# Patient Record
Sex: FEMALE | Race: BLACK OR AFRICAN AMERICAN | Employment: UNEMPLOYED | URBAN - METROPOLITAN AREA
[De-identification: names, ages, dates, MRNs, and addresses within clinical notes are randomized per-mention and may not be internally consistent; named-entity substitution may affect disease eponyms.]

---

## 2014-09-26 LAB — CRC RECOMMENDATION EXT: NORMAL

## 2017-11-27 DIAGNOSIS — I10 BENIGN ESSENTIAL HTN: ICD-10-CM

## 2017-11-27 DIAGNOSIS — Z11.4 ENCOUNTER FOR SCREENING FOR HIV: ICD-10-CM

## 2017-11-27 DIAGNOSIS — Z11.59 NEED FOR HEPATITIS C SCREENING TEST: ICD-10-CM

## 2017-11-27 DIAGNOSIS — Z13.220 SCREENING CHOLESTEROL LEVEL: ICD-10-CM

## 2017-11-27 LAB
ALBUMIN SERPL-MCNC: 4.3 G/DL (ref 3.9–4.9)
ALP BLD-CCNC: 67 U/L (ref 40–130)
ALT SERPL-CCNC: 17 U/L (ref 0–33)
ANION GAP SERPL CALCULATED.3IONS-SCNC: 12 MEQ/L (ref 7–13)
AST SERPL-CCNC: 18 U/L (ref 0–35)
BILIRUB SERPL-MCNC: 0.6 MG/DL (ref 0–1.2)
BUN BLDV-MCNC: 21 MG/DL (ref 8–23)
CALCIUM SERPL-MCNC: 9.8 MG/DL (ref 8.6–10.2)
CHLORIDE BLD-SCNC: 99 MEQ/L (ref 98–107)
CHOLESTEROL, TOTAL: 136 MG/DL (ref 0–199)
CO2: 31 MEQ/L (ref 22–29)
CREAT SERPL-MCNC: 1.01 MG/DL (ref 0.5–0.9)
GFR AFRICAN AMERICAN: >60
GFR NON-AFRICAN AMERICAN: 55.9
GLOBULIN: 3.2 G/DL (ref 2.3–3.5)
GLUCOSE BLD-MCNC: 109 MG/DL (ref 74–109)
HCT VFR BLD CALC: 38.4 % (ref 37–47)
HDLC SERPL-MCNC: 52 MG/DL (ref 40–59)
HEMOGLOBIN: 12.5 G/DL (ref 12–16)
HEPATITIS C ANTIBODY INTERPRETATION: NORMAL
LDL CHOLESTEROL CALCULATED: 43 MG/DL (ref 0–129)
MCH RBC QN AUTO: 28.1 PG (ref 27–31.3)
MCHC RBC AUTO-ENTMCNC: 32.4 % (ref 33–37)
MCV RBC AUTO: 86.8 FL (ref 82–100)
PDW BLD-RTO: 14.4 % (ref 11.5–14.5)
PLATELET # BLD: 226 K/UL (ref 130–400)
POTASSIUM SERPL-SCNC: 4.2 MEQ/L (ref 3.5–5.1)
RBC # BLD: 4.43 M/UL (ref 4.2–5.4)
SODIUM BLD-SCNC: 142 MEQ/L (ref 132–144)
TOTAL PROTEIN: 7.5 G/DL (ref 6.4–8.1)
TRIGL SERPL-MCNC: 206 MG/DL (ref 0–200)
WBC # BLD: 5.5 K/UL (ref 4.8–10.8)

## 2017-11-28 LAB — HIV-1 AND HIV-2 ANTIBODIES: NEGATIVE

## 2018-01-09 ENCOUNTER — HOSPITAL ENCOUNTER (OUTPATIENT)
Dept: WOMENS IMAGING | Age: 63
Discharge: HOME | End: 2018-01-09
Payer: COMMERCIAL

## 2018-01-09 DIAGNOSIS — Z12.31 ENCOUNTER FOR SCREENING MAMMOGRAM FOR BREAST CANCER: ICD-10-CM

## 2018-01-09 PROCEDURE — 77067 SCR MAMMO BI INCL CAD: CPT

## 2018-03-12 ENCOUNTER — TELEPHONE (OUTPATIENT)
Dept: FAMILY MEDICINE CLINIC | Age: 63
End: 2018-03-12

## 2018-03-12 NOTE — TELEPHONE ENCOUNTER
Patient sates she was told to call in if she is not getting better and she was told to call in if she still had the knot on her neck and some antibiotics would be called in. She wanted to know if this can still be done. [facility] in [facility], [place].

## 2018-03-12 NOTE — TELEPHONE ENCOUNTER
Patient stated she wanted an antibiotic because she could not get it let patient know she will not be prescribing medication over the phone especially since she has not been seen since November of last year patient states ok will try to get in

## 2018-04-09 ENCOUNTER — OFFICE VISIT (OUTPATIENT)
Dept: FAMILY MEDICINE CLINIC | Age: 63
End: 2018-04-09
Payer: COMMERCIAL

## 2018-04-09 VITALS
BODY MASS INDEX: 38.44 KG/M2 | DIASTOLIC BLOOD PRESSURE: 82 MMHG | WEIGHT: 239.2 LBS | RESPIRATION RATE: 18 BRPM | SYSTOLIC BLOOD PRESSURE: 116 MMHG | TEMPERATURE: 97 F | HEART RATE: 70 BPM | HEIGHT: 66 IN | OXYGEN SATURATION: 98 %

## 2018-04-09 DIAGNOSIS — R22.42 MASS OF LEFT ANKLE: ICD-10-CM

## 2018-04-09 DIAGNOSIS — I10 BENIGN ESSENTIAL HTN: ICD-10-CM

## 2018-04-09 DIAGNOSIS — Z13.6 SCREENING FOR CARDIOVASCULAR CONDITION: ICD-10-CM

## 2018-04-09 DIAGNOSIS — Z76.0 MEDICATION REFILL: ICD-10-CM

## 2018-04-09 DIAGNOSIS — R22.32 MASS OF LEFT UPPER EXTREMITY: ICD-10-CM

## 2018-04-09 DIAGNOSIS — R63.5 WEIGHT GAIN: ICD-10-CM

## 2018-04-09 DIAGNOSIS — R22.1 NECK MASS: ICD-10-CM

## 2018-04-09 DIAGNOSIS — Z12.11 COLON CANCER SCREENING: ICD-10-CM

## 2018-04-09 LAB
ALBUMIN SERPL-MCNC: 4.2 G/DL (ref 3.9–4.9)
ALP BLD-CCNC: 77 U/L (ref 40–130)
ALT SERPL-CCNC: 18 U/L (ref 0–33)
ANION GAP SERPL CALCULATED.3IONS-SCNC: 15 MEQ/L (ref 7–13)
AST SERPL-CCNC: 18 U/L (ref 0–35)
BASOPHILS ABSOLUTE: 0 K/UL (ref 0–0.2)
BASOPHILS RELATIVE PERCENT: 0.8 %
BILIRUB SERPL-MCNC: 0.4 MG/DL (ref 0–1.2)
BUN BLDV-MCNC: 20 MG/DL (ref 8–23)
CALCIUM SERPL-MCNC: 8.2 MG/DL (ref 8.6–10.2)
CHLORIDE BLD-SCNC: 101 MEQ/L (ref 98–107)
CHOLESTEROL, TOTAL: 131 MG/DL (ref 0–199)
CO2: 28 MEQ/L (ref 22–29)
CREAT SERPL-MCNC: 0.84 MG/DL (ref 0.5–0.9)
EOSINOPHILS ABSOLUTE: 0.2 K/UL (ref 0–0.7)
EOSINOPHILS RELATIVE PERCENT: 3.2 %
GFR AFRICAN AMERICAN: >60
GFR NON-AFRICAN AMERICAN: >60
GLOBULIN: 0.5 G/DL (ref 2.3–3.5)
GLUCOSE BLD-MCNC: 105 MG/DL (ref 74–109)
HCT VFR BLD CALC: 35.7 % (ref 37–47)
HDLC SERPL-MCNC: 44 MG/DL (ref 40–59)
HEMOGLOBIN: 11.8 G/DL (ref 12–16)
LDL CHOLESTEROL CALCULATED: 44 MG/DL (ref 0–129)
LYMPHOCYTES ABSOLUTE: 2.3 K/UL (ref 1–4.8)
LYMPHOCYTES RELATIVE PERCENT: 42.6 %
MCH RBC QN AUTO: 28.5 PG (ref 27–31.3)
MCHC RBC AUTO-ENTMCNC: 32.9 % (ref 33–37)
MCV RBC AUTO: 86.5 FL (ref 82–100)
MONOCYTES ABSOLUTE: 0.5 K/UL (ref 0.2–0.8)
MONOCYTES RELATIVE PERCENT: 8.3 %
NEUTROPHILS ABSOLUTE: 2.5 K/UL (ref 1.4–6.5)
NEUTROPHILS RELATIVE PERCENT: 45.1 %
PDW BLD-RTO: 14.5 % (ref 11.5–14.5)
PLATELET # BLD: 244 K/UL (ref 130–400)
POTASSIUM SERPL-SCNC: 4 MEQ/L (ref 3.5–5.1)
RBC # BLD: 4.13 M/UL (ref 4.2–5.4)
SODIUM BLD-SCNC: 144 MEQ/L (ref 132–144)
T4 FREE: 1.02 NG/DL (ref 0.93–1.7)
TOTAL PROTEIN: 4.7 G/DL (ref 6.4–8.1)
TRIGL SERPL-MCNC: 215 MG/DL (ref 0–200)
TSH SERPL DL<=0.05 MIU/L-ACNC: 0.65 UIU/ML (ref 0.27–4.2)
WBC # BLD: 5.5 K/UL (ref 4.8–10.8)

## 2018-04-09 PROCEDURE — G8427 DOCREV CUR MEDS BY ELIG CLIN: HCPCS | Performed by: PHYSICIAN ASSISTANT

## 2018-04-09 PROCEDURE — 99214 OFFICE O/P EST MOD 30 MIN: CPT | Performed by: PHYSICIAN ASSISTANT

## 2018-04-09 PROCEDURE — 3017F COLORECTAL CA SCREEN DOC REV: CPT | Performed by: PHYSICIAN ASSISTANT

## 2018-04-09 PROCEDURE — 3014F SCREEN MAMMO DOC REV: CPT | Performed by: PHYSICIAN ASSISTANT

## 2018-04-09 PROCEDURE — 1036F TOBACCO NON-USER: CPT | Performed by: PHYSICIAN ASSISTANT

## 2018-04-09 PROCEDURE — G8417 CALC BMI ABV UP PARAM F/U: HCPCS | Performed by: PHYSICIAN ASSISTANT

## 2018-04-09 RX ORDER — METOPROLOL SUCCINATE 100 MG/1
100 TABLET, EXTENDED RELEASE ORAL DAILY
Qty: 90 TABLET | Refills: 3 | Status: SHIPPED | OUTPATIENT
Start: 2018-04-09 | End: 2019-03-19 | Stop reason: ALTCHOICE

## 2018-04-09 RX ORDER — HYDROCHLOROTHIAZIDE 25 MG/1
25 TABLET ORAL DAILY
Qty: 30 TABLET | Refills: 3 | Status: SHIPPED | OUTPATIENT
Start: 2018-04-09 | End: 2018-08-17 | Stop reason: SDUPTHER

## 2018-04-09 RX ORDER — MELOXICAM 7.5 MG/1
TABLET ORAL
COMMUNITY
Start: 2018-02-12 | End: 2018-04-09 | Stop reason: ALTCHOICE

## 2018-04-09 RX ORDER — GABAPENTIN 100 MG/1
CAPSULE ORAL
COMMUNITY
Start: 2018-03-26 | End: 2019-03-19 | Stop reason: ALTCHOICE

## 2018-04-09 ASSESSMENT — ENCOUNTER SYMPTOMS
ABDOMINAL DISTENTION: 0
RECTAL PAIN: 0
CHEST TIGHTNESS: 0
TROUBLE SWALLOWING: 0
WHEEZING: 0
CONSTIPATION: 0
COLOR CHANGE: 0
DIARRHEA: 0
BLOOD IN STOOL: 0
ABDOMINAL PAIN: 0
ANAL BLEEDING: 0
VOMITING: 0
COUGH: 0
ROS SKIN COMMENTS: SKIN LESION
NAUSEA: 0
BACK PAIN: 1
SHORTNESS OF BREATH: 0

## 2018-04-12 ENCOUNTER — TELEPHONE (OUTPATIENT)
Dept: FAMILY MEDICINE CLINIC | Age: 63
End: 2018-04-12

## 2018-04-12 DIAGNOSIS — R22.42 MASS OF LEFT ANKLE: Primary | ICD-10-CM

## 2018-04-12 DIAGNOSIS — R22.32 MASS OF LEFT FOREARM: ICD-10-CM

## 2018-04-19 ENCOUNTER — TELEPHONE (OUTPATIENT)
Dept: FAMILY MEDICINE CLINIC | Age: 63
End: 2018-04-19

## 2018-04-19 RX ORDER — FEXOFENADINE HCL 180 MG/1
180 TABLET ORAL DAILY
Qty: 30 TABLET | Refills: 2 | Status: SHIPPED | OUTPATIENT
Start: 2018-04-19 | End: 2019-03-19 | Stop reason: ALTCHOICE

## 2018-04-19 RX ORDER — FLUTICASONE PROPIONATE 50 MCG
1 SPRAY, SUSPENSION (ML) NASAL DAILY
Qty: 1 BOTTLE | Refills: 3 | Status: SHIPPED | OUTPATIENT
Start: 2018-04-19 | End: 2018-08-17 | Stop reason: SDUPTHER

## 2019-03-19 ENCOUNTER — OFFICE VISIT (OUTPATIENT)
Dept: FAMILY MEDICINE CLINIC | Age: 64
End: 2019-03-19
Payer: COMMERCIAL

## 2019-03-19 VITALS
SYSTOLIC BLOOD PRESSURE: 118 MMHG | RESPIRATION RATE: 14 BRPM | OXYGEN SATURATION: 99 % | HEIGHT: 66 IN | TEMPERATURE: 98.1 F | WEIGHT: 221 LBS | BODY MASS INDEX: 35.52 KG/M2 | HEART RATE: 70 BPM | DIASTOLIC BLOOD PRESSURE: 78 MMHG

## 2019-03-19 DIAGNOSIS — Z13.1 DIABETES MELLITUS SCREENING: ICD-10-CM

## 2019-03-19 DIAGNOSIS — M47.816 LUMBAR SPONDYLOSIS: ICD-10-CM

## 2019-03-19 DIAGNOSIS — Z12.11 COLON CANCER SCREENING: ICD-10-CM

## 2019-03-19 DIAGNOSIS — Z12.39 BREAST CANCER SCREENING: ICD-10-CM

## 2019-03-19 DIAGNOSIS — E78.2 MIXED HYPERLIPIDEMIA: ICD-10-CM

## 2019-03-19 DIAGNOSIS — I10 BENIGN ESSENTIAL HTN: Primary | ICD-10-CM

## 2019-03-19 DIAGNOSIS — F41.1 GAD (GENERALIZED ANXIETY DISORDER): ICD-10-CM

## 2019-03-19 DIAGNOSIS — I10 BENIGN ESSENTIAL HTN: ICD-10-CM

## 2019-03-19 LAB
ALBUMIN SERPL-MCNC: 4.3 G/DL (ref 3.5–4.6)
ALP BLD-CCNC: 100 U/L (ref 40–130)
ALT SERPL-CCNC: <5 U/L (ref 0–33)
ANION GAP SERPL CALCULATED.3IONS-SCNC: 14 MEQ/L (ref 9–15)
AST SERPL-CCNC: 61 U/L (ref 0–35)
BILIRUB SERPL-MCNC: 0.5 MG/DL (ref 0.2–0.7)
BUN BLDV-MCNC: 21 MG/DL (ref 8–23)
CALCIUM SERPL-MCNC: 9.5 MG/DL (ref 8.5–9.9)
CHLORIDE BLD-SCNC: 101 MEQ/L (ref 95–107)
CHOLESTEROL, TOTAL: 130 MG/DL (ref 0–199)
CO2: 27 MEQ/L (ref 20–31)
CREAT SERPL-MCNC: 1.05 MG/DL (ref 0.5–0.9)
GFR AFRICAN AMERICAN: >60
GFR NON-AFRICAN AMERICAN: 53.2
GLOBULIN: 3.5 G/DL (ref 2.3–3.5)
GLUCOSE BLD-MCNC: 106 MG/DL (ref 70–99)
HBA1C MFR BLD: 5.8 % (ref 4.8–5.9)
HCT VFR BLD CALC: 37.7 % (ref 37–47)
HDLC SERPL-MCNC: 39 MG/DL (ref 40–59)
HEMOGLOBIN: 12.4 G/DL (ref 12–16)
LDL CHOLESTEROL CALCULATED: 69 MG/DL (ref 0–129)
MCH RBC QN AUTO: 28.3 PG (ref 27–31.3)
MCHC RBC AUTO-ENTMCNC: 33 % (ref 33–37)
MCV RBC AUTO: 85.9 FL (ref 82–100)
PDW BLD-RTO: 14.1 % (ref 11.5–14.5)
PLATELET # BLD: 251 K/UL (ref 130–400)
POTASSIUM SERPL-SCNC: 4.7 MEQ/L (ref 3.4–4.9)
RBC # BLD: 4.39 M/UL (ref 4.2–5.4)
SODIUM BLD-SCNC: 142 MEQ/L (ref 135–144)
TOTAL PROTEIN: 7.8 G/DL (ref 6.3–8)
TRIGL SERPL-MCNC: 110 MG/DL (ref 0–150)
VITAMIN D 25-HYDROXY: 13 NG/ML (ref 30–100)
WBC # BLD: 5.7 K/UL (ref 4.8–10.8)

## 2019-03-19 PROCEDURE — 3017F COLORECTAL CA SCREEN DOC REV: CPT | Performed by: PHYSICIAN ASSISTANT

## 2019-03-19 PROCEDURE — G8484 FLU IMMUNIZE NO ADMIN: HCPCS | Performed by: PHYSICIAN ASSISTANT

## 2019-03-19 PROCEDURE — G8427 DOCREV CUR MEDS BY ELIG CLIN: HCPCS | Performed by: PHYSICIAN ASSISTANT

## 2019-03-19 PROCEDURE — 99214 OFFICE O/P EST MOD 30 MIN: CPT | Performed by: PHYSICIAN ASSISTANT

## 2019-03-19 PROCEDURE — 1036F TOBACCO NON-USER: CPT | Performed by: PHYSICIAN ASSISTANT

## 2019-03-19 PROCEDURE — G8417 CALC BMI ABV UP PARAM F/U: HCPCS | Performed by: PHYSICIAN ASSISTANT

## 2019-03-19 RX ORDER — METOPROLOL SUCCINATE 100 MG/1
100 TABLET, EXTENDED RELEASE ORAL DAILY
Qty: 90 TABLET | Refills: 4 | Status: SHIPPED | OUTPATIENT
Start: 2019-03-19 | End: 2020-06-02 | Stop reason: SDUPTHER

## 2019-03-19 RX ORDER — LISINOPRIL AND HYDROCHLOROTHIAZIDE 25; 20 MG/1; MG/1
TABLET ORAL
Qty: 90 TABLET | Refills: 4 | Status: SHIPPED | OUTPATIENT
Start: 2019-03-19 | End: 2020-01-30 | Stop reason: SDUPTHER

## 2019-03-19 RX ORDER — TRAZODONE HYDROCHLORIDE 50 MG/1
TABLET ORAL
Refills: 0 | COMMUNITY
Start: 2019-01-15 | End: 2019-03-19 | Stop reason: ALTCHOICE

## 2019-03-19 RX ORDER — CARIPRAZINE 1.5 MG/1
CAPSULE, GELATIN COATED ORAL
Refills: 0 | COMMUNITY
Start: 2019-01-15 | End: 2020-06-09 | Stop reason: SINTOL

## 2019-03-19 RX ORDER — QUETIAPINE FUMARATE 300 MG/1
300 TABLET, FILM COATED ORAL
COMMUNITY
Start: 2017-09-29 | End: 2019-03-19 | Stop reason: ALTCHOICE

## 2019-03-19 ASSESSMENT — ENCOUNTER SYMPTOMS
CONSTIPATION: 0
ABDOMINAL DISTENTION: 0
ABDOMINAL PAIN: 0
WHEEZING: 0
SHORTNESS OF BREATH: 0
ANAL BLEEDING: 0
RECTAL PAIN: 0
DIARRHEA: 0
BACK PAIN: 1
COLOR CHANGE: 0
CHEST TIGHTNESS: 0
NAUSEA: 0
BLOOD IN STOOL: 0
TROUBLE SWALLOWING: 0
VOMITING: 0
COUGH: 0

## 2019-03-21 ENCOUNTER — TELEPHONE (OUTPATIENT)
Dept: FAMILY MEDICINE CLINIC | Age: 64
End: 2019-03-21

## 2019-03-21 RX ORDER — ERGOCALCIFEROL 1.25 MG/1
50000 CAPSULE ORAL WEEKLY
Qty: 12 CAPSULE | Refills: 1 | Status: SHIPPED | OUTPATIENT
Start: 2019-03-21 | End: 2019-09-22 | Stop reason: SDUPTHER

## 2019-10-01 ENCOUNTER — TELEPHONE (OUTPATIENT)
Dept: FAMILY MEDICINE CLINIC | Age: 64
End: 2019-10-01

## 2019-10-01 DIAGNOSIS — Z12.39 SCREENING FOR BREAST CANCER: Primary | ICD-10-CM

## 2019-10-08 ENCOUNTER — TELEPHONE (OUTPATIENT)
Dept: FAMILY MEDICINE CLINIC | Age: 64
End: 2019-10-08

## 2019-10-08 DIAGNOSIS — I10 BENIGN ESSENTIAL HTN: ICD-10-CM

## 2019-10-08 DIAGNOSIS — E55.9 VITAMIN D DEFICIENCY: Primary | ICD-10-CM

## 2019-10-10 ENCOUNTER — HOSPITAL ENCOUNTER (OUTPATIENT)
Dept: WOMENS IMAGING | Age: 64
Discharge: HOME | End: 2019-10-12
Payer: COMMERCIAL

## 2019-10-10 DIAGNOSIS — Z12.39 BREAST CANCER SCREENING: ICD-10-CM

## 2019-10-10 DIAGNOSIS — E55.9 VITAMIN D DEFICIENCY: ICD-10-CM

## 2019-10-10 DIAGNOSIS — Z12.39 SCREENING FOR BREAST CANCER: ICD-10-CM

## 2019-10-10 DIAGNOSIS — I10 BENIGN ESSENTIAL HTN: ICD-10-CM

## 2019-10-10 LAB
ANION GAP SERPL CALCULATED.3IONS-SCNC: 17 MEQ/L (ref 9–15)
BUN BLDV-MCNC: 23 MG/DL (ref 8–23)
CALCIUM SERPL-MCNC: 9.9 MG/DL (ref 8.5–9.9)
CHLORIDE BLD-SCNC: 101 MEQ/L (ref 95–107)
CO2: 27 MEQ/L (ref 20–31)
CREAT SERPL-MCNC: 1.04 MG/DL (ref 0.5–0.9)
GFR AFRICAN AMERICAN: >60
GFR NON-AFRICAN AMERICAN: 53.7
GLUCOSE BLD-MCNC: 106 MG/DL (ref 70–99)
POTASSIUM SERPL-SCNC: 3.9 MEQ/L (ref 3.4–4.9)
SODIUM BLD-SCNC: 145 MEQ/L (ref 135–144)
VITAMIN D 25-HYDROXY: 31.8 NG/ML (ref 30–100)

## 2019-10-10 PROCEDURE — 77067 SCR MAMMO BI INCL CAD: CPT

## 2020-05-26 RX ORDER — ERGOCALCIFEROL 1.25 MG/1
CAPSULE ORAL
Qty: 12 CAPSULE | Refills: 1 | OUTPATIENT
Start: 2020-05-26

## 2020-05-26 RX ORDER — METOPROLOL SUCCINATE 100 MG/1
100 TABLET, EXTENDED RELEASE ORAL DAILY
Qty: 90 TABLET | Refills: 2 | OUTPATIENT
Start: 2020-05-26

## 2020-06-02 RX ORDER — LISINOPRIL AND HYDROCHLOROTHIAZIDE 25; 20 MG/1; MG/1
TABLET ORAL
Qty: 90 TABLET | Refills: 2 | Status: SHIPPED | OUTPATIENT
Start: 2020-06-02 | End: 2021-03-11 | Stop reason: SINTOL

## 2020-06-02 RX ORDER — METOPROLOL SUCCINATE 100 MG/1
100 TABLET, EXTENDED RELEASE ORAL DAILY
Qty: 90 TABLET | Refills: 2 | Status: SHIPPED | OUTPATIENT
Start: 2020-06-02 | End: 2021-01-29

## 2020-06-09 ENCOUNTER — VIRTUAL VISIT (OUTPATIENT)
Dept: FAMILY MEDICINE CLINIC | Age: 65
End: 2020-06-09
Payer: COMMERCIAL

## 2020-06-09 PROCEDURE — G8428 CUR MEDS NOT DOCUMENT: HCPCS | Performed by: PHYSICIAN ASSISTANT

## 2020-06-09 PROCEDURE — 99214 OFFICE O/P EST MOD 30 MIN: CPT | Performed by: PHYSICIAN ASSISTANT

## 2020-06-09 PROCEDURE — 3017F COLORECTAL CA SCREEN DOC REV: CPT | Performed by: PHYSICIAN ASSISTANT

## 2020-06-09 RX ORDER — ERGOCALCIFEROL 1.25 MG/1
CAPSULE ORAL
Qty: 12 CAPSULE | Refills: 4 | Status: SHIPPED | OUTPATIENT
Start: 2020-06-09 | End: 2021-05-26

## 2020-06-09 RX ORDER — FLUTICASONE PROPIONATE 50 MCG
SPRAY, SUSPENSION (ML) NASAL
Qty: 1 BOTTLE | Refills: 3 | Status: SHIPPED | OUTPATIENT
Start: 2020-06-09 | End: 2020-06-09

## 2020-06-09 ASSESSMENT — PATIENT HEALTH QUESTIONNAIRE - PHQ9
SUM OF ALL RESPONSES TO PHQ9 QUESTIONS 1 & 2: 2
SUM OF ALL RESPONSES TO PHQ QUESTIONS 1-9: 2
SUM OF ALL RESPONSES TO PHQ QUESTIONS 1-9: 2
1. LITTLE INTEREST OR PLEASURE IN DOING THINGS: 1
2. FEELING DOWN, DEPRESSED OR HOPELESS: 1

## 2020-06-09 NOTE — PROGRESS NOTES
2020    TELEHEALTH EVALUATION -- Audio/Visual (During VXIDO-79 public health emergency)    Due to COVID 19 outbreak, patient's office visit was converted to a virtual visit. Patient was contacted and agreed to proceed with a virtual visit via Telephone Visit--total length of time 20 minutes. The risks and benefits of converting to a virtual visit were discussed in light of the current infectious disease epidemic. Patient also understood that insurance coverage and co-pays are up to their individual insurance plans. HPI:    Aaron Simmonds (:  1955) has requested an audio/video evaluation for the following concern(s):    Last OV with me: 3/19/2019  UTD with mammogram (10/2019)  Due for colon ca screen. Has cologuard box at home. Has not completed it yet. Patient has a history of HTN, BERT, chronic back pain.       Chronic back pain:  She is followed by [facility] pain [facility] (last OV was 2019) for chronic pain. Next OV is on 6/10/2020. She continues to take Tramadol 50mg PO BID as needed for pain, Lyrica 25mg once daily. She reports her current regimen offers her 50-60% pain relief. Some days are \"better than others\". Weather related--pattern. Denies new injury/fall/trauma. [facility] report ran and is consistent with current and past history concerning scheduled medications. Constipation. +abdominal bloating, discomfort with hard stool. Most likely related to chronic opioid use. Will have a bowel movement every couple of days. Denies melena, hematochezia. BERT:  Patient sees a counselor and psychiatrist at [facility]. Symptoms are stable with ativan. She takes medication PRN. Never started vraylar due to reading about the side effects.      Essential HTN:  Well-controlled on current medications. Denies CP, SOB, QUINTERO, edema of lower extremities. Seasonal allergies. Increased nasal congestion/PND this time of year.   Previous Rx for Flonase did provide good relief. Asking if she can have this refilled today. No fever, cough, SOB. Review of Systems   Constitutional: Negative for activity change, appetite change, chills, diaphoresis, fatigue, fever and unexpected weight change. HENT: Negative for congestion, mouth sores, nosebleeds and trouble swallowing. Eyes: Negative for visual disturbance. Respiratory: Negative for cough, chest tightness, shortness of breath and wheezing. Cardiovascular: Negative for chest pain, palpitations and leg swelling. Gastrointestinal: Positive for abdominal distention (bloating) and constipation. Negative for abdominal pain, anal bleeding, blood in stool, diarrhea, nausea, rectal pain and vomiting. Genitourinary: Negative for dysuria. Musculoskeletal: Positive for arthralgias, back pain, gait problem (intermittent) and myalgias. Chronic   Skin: Negative for color change, rash and wound. Neurological: Negative for dizziness, weakness, light-headedness and headaches. Hematological: Negative for adenopathy. Does not bruise/bleed easily. Psychiatric/Behavioral: Negative for dysphoric mood and sleep disturbance. The patient is nervous/anxious (controlled). Prior to Visit Medications    Medication Sig Taking? Authorizing Provider   vitamin D (ERGOCALCIFEROL) 1.25 MG (93204 UT) CAPS capsule TAKE 1 CAPSULE BY MOUTH 1 TIME A WEEK Yes Norma Espinosa PA-C   naloxegol (MOVANTIK) 12.5 MG TABS tablet Take 1 tablet by mouth every morning Yes Norma Espinosa PA-C   lisinopril-hydroCHLOROthiazide (PRINZIDE;ZESTORETIC) 20-25 MG per tablet TAKE 1 TABLET BY MOUTH DAILY Yes Norma Espinosa PA-C   metoprolol succinate (TOPROL XL) 100 MG extended release tablet Take 1 tablet by mouth daily Yes Norma Espinosa PA-C   fluticasone (FLONASE) 50 MCG/ACT nasal spray SHAKE LIQUID AND USE 1 SPRAY IN EACH NOSTRIL DAILY  Norma Espinosa PA-C   LORazepam (ATIVAN) 2 MG tablet Take 2 mg by mouth every 6 hours as needed for Anxiety .   Historical

## 2020-06-10 PROBLEM — K59.03 THERAPEUTIC OPIOID INDUCED CONSTIPATION: Status: ACTIVE | Noted: 2020-06-10

## 2020-06-10 PROBLEM — J30.2 SEASONAL ALLERGIES: Status: ACTIVE | Noted: 2020-06-10

## 2020-06-10 PROBLEM — T40.2X5A THERAPEUTIC OPIOID INDUCED CONSTIPATION: Status: ACTIVE | Noted: 2020-06-10

## 2020-06-10 PROBLEM — E55.9 VITAMIN D DEFICIENCY: Status: ACTIVE | Noted: 2020-06-10

## 2020-06-10 PROBLEM — I10 BENIGN ESSENTIAL HTN: Status: ACTIVE | Noted: 2020-06-10

## 2020-06-10 ASSESSMENT — ENCOUNTER SYMPTOMS
CONSTIPATION: 1
WHEEZING: 0
BLOOD IN STOOL: 0
COUGH: 0
ABDOMINAL DISTENTION: 1
ABDOMINAL PAIN: 0
COLOR CHANGE: 0
TROUBLE SWALLOWING: 0
ANAL BLEEDING: 0
VOMITING: 0
DIARRHEA: 0
BACK PAIN: 1
RECTAL PAIN: 0
SHORTNESS OF BREATH: 0
NAUSEA: 0
CHEST TIGHTNESS: 0

## 2020-06-25 ENCOUNTER — TELEPHONE (OUTPATIENT)
Dept: FAMILY MEDICINE CLINIC | Age: 65
End: 2020-06-25

## 2020-06-25 NOTE — TELEPHONE ENCOUNTER
Patient called today and stated that she is feeling like that she is having a sinus infection and is wondering if there is a way that a medication can be called into the pharmacy.

## 2020-07-24 ENCOUNTER — TELEPHONE (OUTPATIENT)
Dept: FAMILY MEDICINE CLINIC | Age: 65
End: 2020-07-24

## 2020-07-24 NOTE — TELEPHONE ENCOUNTER
Patient states the nasal spray is not working and wants an antibiotic for her sinuses. I offered her to come to the walk-in clinic and she refused stating she has an appointment on 8/3/20 and states she will wait for that.

## 2020-08-03 ENCOUNTER — VIRTUAL VISIT (OUTPATIENT)
Dept: FAMILY MEDICINE CLINIC | Age: 65
End: 2020-08-03
Payer: COMMERCIAL

## 2020-08-03 PROCEDURE — G0438 PPPS, INITIAL VISIT: HCPCS | Performed by: PHYSICIAN ASSISTANT

## 2020-08-03 PROCEDURE — 3017F COLORECTAL CA SCREEN DOC REV: CPT | Performed by: PHYSICIAN ASSISTANT

## 2020-08-03 RX ORDER — FEXOFENADINE HCL 180 MG/1
180 TABLET ORAL DAILY
Qty: 90 TABLET | Refills: 1 | Status: SHIPPED | OUTPATIENT
Start: 2020-08-03 | End: 2021-08-17 | Stop reason: SDUPTHER

## 2020-08-03 RX ORDER — AMOXICILLIN 875 MG/1
875 TABLET, COATED ORAL 2 TIMES DAILY
Qty: 20 TABLET | Refills: 0 | Status: SHIPPED | OUTPATIENT
Start: 2020-08-03 | End: 2020-08-13

## 2020-08-03 NOTE — PROGRESS NOTES
yellow/green with increased cheek pressure. Thinks she may have a sinus infection. Review of Systems   Constitutional: Negative for appetite change, chills, diaphoresis, fatigue, fever and unexpected weight change. HENT: Positive for congestion, postnasal drip, sinus pressure, sinus pain and sneezing. Respiratory: Negative for cough, chest tightness and shortness of breath. Cardiovascular: Negative for chest pain, palpitations and leg swelling. Endocrine: Negative for polydipsia, polyphagia and polyuria. Genitourinary: Positive for frequency. Negative for decreased urine volume, enuresis, urgency, vaginal bleeding and vaginal discharge. Musculoskeletal: Positive for arthralgias, back pain and myalgias. Allergic/Immunologic: Positive for environmental allergies. Prior to Visit Medications    Medication Sig Taking? Authorizing Provider   fexofenadine (ALLEGRA) 180 MG tablet Take 1 tablet by mouth daily Yes Norma Espinosa PA-C   amoxicillin (AMOXIL) 875 MG tablet Take 1 tablet by mouth 2 times daily for 10 days Yes Norma Espinosa PA-C   fluticasone (FLONASE) 50 MCG/ACT nasal spray SHAKE LIQUID AND USE 1 SPRAY IN EACH NOSTRIL DAILY  Norma Espinosa PA-C   vitamin D (ERGOCALCIFEROL) 1.25 MG (67510 UT) CAPS capsule TAKE 1 CAPSULE BY MOUTH 1 TIME A WEEK  Norma Espinosa PA-C   naloxegol (MOVANTIK) 12.5 MG TABS tablet Take 1 tablet by mouth every morning  Norma Espinosa PA-C   lisinopril-hydroCHLOROthiazide (PRINZIDE;ZESTORETIC) 20-25 MG per tablet TAKE 1 TABLET BY MOUTH DAILY  Norma Espinosa PA-C   metoprolol succinate (TOPROL XL) 100 MG extended release tablet Take 1 tablet by mouth daily  Norma Espinosa PA-C   LORazepam (ATIVAN) 2 MG tablet Take 2 mg by mouth every 6 hours as needed for Anxiety .   Historical Provider, MD   traMADol (ULTRAM) 50 MG tablet 1-2 tablet by mouth 3 times a day  for pain prn  Ingrid Aldana MD       Social History     Tobacco Use    Smoking status: Never Smoker    Smokeless tobacco: Never Used   Substance Use Topics    Alcohol use: No     Alcohol/week: 0.0 standard drinks    Drug use: No      Allergies   Allergen Reactions    Ibuprofen Other (See Comments)    Midazolam Hcl      Other reaction(s):  Other: See Comments  Makes her feel more anxious    Nsaids Other (See Comments)    Sulfa Antibiotics Other (See Comments)     elevated blood pressure    Sulfasalazine Other (See Comments)     elevated blood pressure   ,   Past Medical History:   Diagnosis Date    Anxiety     Chronic back pain     Hypertension     Obesity     Pain     ankle and leg   ,   Past Surgical History:   Procedure Laterality Date    CYST REMOVAL      FRACTURE SURGERY      HYSTERECTOMY     ,   Social History     Tobacco Use    Smoking status: Never Smoker    Smokeless tobacco: Never Used   Substance Use Topics    Alcohol use: No     Alcohol/week: 0.0 standard drinks    Drug use: No   ,   Family History   Problem Relation Age of Onset    Cancer Mother         esophageal    Seizures Father     Seizures Brother         back problems    Cancer Maternal Aunt         esophageal    Cancer Maternal Uncle         lung   ,   There is no immunization history on file for this patient.,   Health Maintenance   Topic Date Due    DTaP/Tdap/Td vaccine (1 - Tdap) 08/14/1976    Shingles Vaccine (1 of 2) 08/14/2007    Cervical cancer screen  04/27/2013    A1C test (Diabetic or Prediabetic)  03/19/2020    Annual Wellness Visit (AWV)  06/02/2020    Flu vaccine (1) 09/01/2020    Potassium monitoring  10/10/2020    Creatinine monitoring  10/10/2020    Breast cancer screen  10/10/2021    Lipid screen  03/19/2024    Colon cancer screen colonoscopy  05/09/2026    Hepatitis C screen  Completed    HIV screen  Completed    Hepatitis A vaccine  Aged Out    Hepatitis B vaccine  Aged Out    Hib vaccine  Aged Out    Meningococcal (ACWY) vaccine  Aged Out    Pneumococcal 0-64 years Vaccine  Aged Out     PHYSICAL EXAMINATION:  [ INSTRUCTIONS:  \"[x]\" Indicates a positive item  \"[]\" Indicates a negative item  -- DELETE ALL ITEMS NOT EXAMINED]  [x] Alert  [x] Oriented to person/place/time    [x] No apparent distress  [] Toxic appearing    [] Face flushed appearing [] Sclera clear  [] Lips are cyanotic      [x] Breathing appears normal  [] Appears tachypneic      [] Rash on visible skin    [x] Cranial Nerves II-XII grossly intact    [x] Motor grossly intact in visible upper extremities    [] Motor grossly intact in visible lower extremities    [x] Normal Mood  [] Anxious appearing    [] Depressed appearing  [] Confused appearing      [] Poor short term memory  [] Poor long term memory    [] OTHER:      Due to this being a TeleHealth encounter, evaluation of the following organ systems is limited: Vitals/Constitutional/EENT/Resp/CV/GI//MS/Neuro/Skin/Heme-Lymph-Imm. ASSESSMENT/PLAN:  1. Routine general medical examination at a health care facility  AWV completed today. 2. Acute non-recurrent maxillary sinusitis  Continue flonase.     - amoxicillin (AMOXIL) 875 MG tablet; Take 1 tablet by mouth 2 times daily for 10 days  Dispense: 20 tablet; Refill: 0    3. Seasonal allergies  Add antihistamine    - fexofenadine (ALLEGRA) 180 MG tablet; Take 1 tablet by mouth daily  Dispense: 90 tablet; Refill: 1    4. Benign essential HTN  Controlled. Discussed  the lisinopril and hctz to see if stopping the HCTZ improves the nocturnal urination/frequency. 5. Lumbar spondylosis  [facility] pain management. 6. Neuropathic pain syndrome (non-herpetic)  Taking lyrica. Return in 7 weeks (on 9/21/2020) for Medicare Annual Wellness Visit in 1 year, follow up in office. An  electronic signature was used to authenticate this note.     --Yanique Abel PA-C on 8/5/2020 at 2:07 PM        Pursuant to the emergency declaration under the [address] [facility] waiver authority and the Coronavirus Preparedness and Response Supplemental Appropriations Act, this Virtual  Visit was conducted, with patient's consent, to reduce the patient's risk of exposure to COVID-19 and provide continuity of care for an established patient. Services were provided through a video synchronous discussion virtually to substitute for in-person clinic visit. Medicare Annual Wellness Visit  Name: Lilliam Espinoza Date: 2020   MRN: [id number] Sex: Female   Age: 60 y.o. Ethnicity: Non-/Non    : 1955 Race: Black      Vilma Rock is here for Medicare AWV and Hypertension (follow up )    Screenings for behavioral, psychosocial and functional/safety risks, and cognitive dysfunction are all negative except as indicated below. These results, as well as other patient data from the [address] form, are documented in Flowsheets linked to this Encounter. Allergies   Allergen Reactions    Ibuprofen Other (See Comments)    Midazolam Hcl      Other reaction(s): Other: See Comments  Makes her feel more anxious    Nsaids Other (See Comments)    Sulfa Antibiotics Other (See Comments)     elevated blood pressure    Sulfasalazine Other (See Comments)     elevated blood pressure         Prior to Visit Medications    Medication Sig Taking?  Authorizing Provider   fexofenadine (ALLEGRA) 180 MG tablet Take 1 tablet by mouth daily Yes Norma Espinosa PA-C   amoxicillin (AMOXIL) 875 MG tablet Take 1 tablet by mouth 2 times daily for 10 days Yes Norma Espinosa PA-C   fluticasone (FLONASE) 50 MCG/ACT nasal spray SHAKE LIQUID AND USE 1 SPRAY IN EACH NOSTRIL DAILY  Norma Espinosa PA-C   vitamin D (ERGOCALCIFEROL) 1.25 MG (93568 UT) CAPS capsule TAKE 1 CAPSULE BY MOUTH 1 TIME A WEEK  Norma Espinosa PA-C   naloxegol (MOVANTIK) 12.5 MG TABS tablet Take 1 tablet by mouth every morning  Norma Espinosa PA-C   lisinopril-hydroCHLOROthiazide (PRINZIDE;ZESTORETIC) 20-25 MG per tablet TAKE 1 TABLET BY MOUTH DAILY or weight on file to calculate BMI. Health Habits/Nutrition Interventions:  · Inadequate physical activity:  patient is not ready to increase his/her physical activity level at this time    Personalized Preventive Plan   Current Health Maintenance Status    There is no immunization history on file for this patient. Health Maintenance   Topic Date Due    DTaP/Tdap/Td vaccine (1 - Tdap) 08/14/1976    Shingles Vaccine (1 of 2) 08/14/2007    Cervical cancer screen  04/27/2013    A1C test (Diabetic or Prediabetic)  03/19/2020    Annual Wellness Visit (AWV)  06/02/2020    Flu vaccine (1) 09/01/2020    Potassium monitoring  10/10/2020    Creatinine monitoring  10/10/2020    Breast cancer screen  10/10/2021    Lipid screen  03/19/2024    Colon cancer screen colonoscopy  05/09/2026    Hepatitis C screen  Completed    HIV screen  Completed    Hepatitis A vaccine  Aged Out    Hepatitis B vaccine  Aged Out    Hib vaccine  Aged Out    Meningococcal (ACWY) vaccine  Aged Out    Pneumococcal 0-64 years Vaccine  Aged Out     Recommendations for [employer] Due: see orders and patient instructions/AVS.  . Recommended screening schedule for the next 5-10 years is provided to the patient in written form: see Patient Instructions/AVS.    Mg Land was seen today for medicare awv and hypertension. Diagnoses and all orders for this visit:    Routine general medical examination at a health care facility    Acute non-recurrent maxillary sinusitis  -     amoxicillin (AMOXIL) 875 MG tablet; Take 1 tablet by mouth 2 times daily for 10 days    Seasonal allergies  -     fexofenadine (ALLEGRA) 180 MG tablet; Take 1 tablet by mouth daily    Benign essential HTN    Lumbar spondylosis    Neuropathic pain syndrome (non-herpetic)            Los Leahy is a 60 y.o. female being evaluated by a Virtual Visit (video and audio) encounter to address concerns as mentioned above. A caregiver was present when appropriate. Due to this being a TeleHealth encounter (During ZSRDN-60 public health emergency), evaluation of the following organ systems was limited: Vitals/Constitutional/EENT/Resp/CV/GI//MS/Neuro/Skin/Heme-Lymph-Imm. Pursuant to the emergency declaration under the [address] [place] [address], [address] [place] [address] and the [employer] Act, this Virtual Visit was conducted with patient's (and/or legal guardian's) consent, to reduce the patient's risk of exposure to COVID-19 and provide necessary medical care. The patient (and/or legal guardian) has also been advised to contact this office for worsening conditions or problems, and seek emergency medical treatment and/or call 911 if deemed necessary. Patient identification was verified at the start of the visit: Yes    Services were provided through a video synchronous discussion virtually to substitute for in-person clinic visit. Patient and provider were located at their individual homes. --Erendira Leon PA-C on 8/5/2020 at 2:07 PM    An electronic signature was used to authenticate this note.

## 2020-08-05 PROBLEM — J01.00 ACUTE NON-RECURRENT MAXILLARY SINUSITIS: Status: ACTIVE | Noted: 2020-08-05

## 2020-08-05 ASSESSMENT — PATIENT HEALTH QUESTIONNAIRE - PHQ9
SUM OF ALL RESPONSES TO PHQ QUESTIONS 1-9: 0
SUM OF ALL RESPONSES TO PHQ QUESTIONS 1-9: 0

## 2020-08-05 ASSESSMENT — ENCOUNTER SYMPTOMS
SHORTNESS OF BREATH: 0
CHEST TIGHTNESS: 0
COUGH: 0
BACK PAIN: 1
SINUS PRESSURE: 1
SINUS PAIN: 1

## 2020-08-05 NOTE — PATIENT INSTRUCTIONS
Personalized Preventive Plan for Neisha Mcfadden - 8/3/2020  Medicare offers a range of preventive health benefits. Some of the tests and screenings are paid in full while other may be subject to a deductible, co-insurance, and/or copay. Some of these benefits include a comprehensive review of your medical history including lifestyle, illnesses that may run in your family, and various assessments and screenings as appropriate. After reviewing your medical record and screening and assessments performed today your provider may have ordered immunizations, labs, imaging, and/or referrals for you. A list of these orders (if applicable) as well as your Preventive Care list are included within your After Visit Summary for your review. Other Preventive Recommendations:    · A preventive eye exam performed by an eye specialist is recommended every 1-2 years to screen for glaucoma; cataracts, macular degeneration, and other eye disorders. · A preventive dental visit is recommended every 6 months. · Try to get at least 150 minutes of exercise per week or 10,000 steps per day on a pedometer . · Order or download the FREE \"Exercise & Physical Activity: Your Everyday Guide\" from [employer]. Call 4-749.468.1042 or search [employer] online. · You need 9684-3335 mg of calcium and 7618-7629 IU of vitamin D per day. It is possible to meet your calcium requirement with diet alone, but a vitamin D supplement is usually necessary to meet this goal.  · When exposed to the sun, use a sunscreen that protects against both UVA and UVB radiation with an SPF of 30 or greater. Reapply every 2 to 3 hours or after sweating, drying off with a towel, or swimming. · Always wear a seat belt when traveling in a car. Always wear a helmet when riding a bicycle or motorcycle.

## 2021-02-16 ENCOUNTER — VIRTUAL VISIT (OUTPATIENT)
Dept: FAMILY MEDICINE CLINIC | Age: 66
End: 2021-02-16
Payer: MEDICARE

## 2021-02-16 DIAGNOSIS — M47.816 ARTHROPATHY OF LUMBAR FACET JOINT: ICD-10-CM

## 2021-02-16 DIAGNOSIS — E55.9 VITAMIN D DEFICIENCY: ICD-10-CM

## 2021-02-16 DIAGNOSIS — F43.21 GRIEF REACTION: ICD-10-CM

## 2021-02-16 DIAGNOSIS — F41.9 ANXIETY: ICD-10-CM

## 2021-02-16 DIAGNOSIS — K59.03 THERAPEUTIC OPIOID INDUCED CONSTIPATION: ICD-10-CM

## 2021-02-16 DIAGNOSIS — Z13.220 SCREENING CHOLESTEROL LEVEL: ICD-10-CM

## 2021-02-16 DIAGNOSIS — R82.90 MALODOROUS URINE: ICD-10-CM

## 2021-02-16 DIAGNOSIS — Z12.31 ENCOUNTER FOR SCREENING MAMMOGRAM FOR MALIGNANT NEOPLASM OF BREAST: ICD-10-CM

## 2021-02-16 DIAGNOSIS — T40.2X5A THERAPEUTIC OPIOID INDUCED CONSTIPATION: ICD-10-CM

## 2021-02-16 DIAGNOSIS — I10 BENIGN ESSENTIAL HTN: Primary | ICD-10-CM

## 2021-02-16 PROBLEM — F43.20 GRIEF REACTION: Status: ACTIVE | Noted: 2021-02-16

## 2021-02-16 PROBLEM — J01.00 ACUTE NON-RECURRENT MAXILLARY SINUSITIS: Status: RESOLVED | Noted: 2020-08-05 | Resolved: 2021-02-16

## 2021-02-16 PROCEDURE — 81002 URINALYSIS NONAUTO W/O SCOPE: CPT | Performed by: PHYSICIAN ASSISTANT

## 2021-02-16 PROCEDURE — G8428 CUR MEDS NOT DOCUMENT: HCPCS | Performed by: PHYSICIAN ASSISTANT

## 2021-02-16 PROCEDURE — 3017F COLORECTAL CA SCREEN DOC REV: CPT | Performed by: PHYSICIAN ASSISTANT

## 2021-02-16 PROCEDURE — 99214 OFFICE O/P EST MOD 30 MIN: CPT | Performed by: PHYSICIAN ASSISTANT

## 2021-02-16 RX ORDER — DOCUSATE SODIUM 100 MG/1
100 CAPSULE, LIQUID FILLED ORAL 2 TIMES DAILY
Qty: 60 CAPSULE | Refills: 5 | Status: SHIPPED | OUTPATIENT
Start: 2021-02-16 | End: 2021-03-18

## 2021-02-16 ASSESSMENT — ENCOUNTER SYMPTOMS
ABDOMINAL PAIN: 0
SINUS PRESSURE: 0
SINUS PAIN: 0
BLOOD IN STOOL: 0
BACK PAIN: 1
SHORTNESS OF BREATH: 0
COLOR CHANGE: 0
NAUSEA: 0
ABDOMINAL DISTENTION: 0
DIARRHEA: 0
RECTAL PAIN: 0
CONSTIPATION: 1
CHEST TIGHTNESS: 0
COUGH: 0

## 2021-02-16 ASSESSMENT — PATIENT HEALTH QUESTIONNAIRE - PHQ9
1. LITTLE INTEREST OR PLEASURE IN DOING THINGS: 1
SUM OF ALL RESPONSES TO PHQ9 QUESTIONS 1 & 2: 2
SUM OF ALL RESPONSES TO PHQ QUESTIONS 1-9: 2

## 2021-02-16 NOTE — PROGRESS NOTES
2021    TELEHEALTH EVALUATION -- Audio/Visual (During SRENE-25 public health emergency)    Due to [place] 19 outbreak, patient's office visit was converted to a virtual visit. Patient was contacted and agreed to proceed with a virtual visit via Telephone Visit--total length of call 15-20 minutes. The risks and benefits of converting to a virtual visit were discussed in light of the current infectious disease epidemic. Patient also understood that insurance coverage and co-pays are up to their individual insurance plans. HPI:    Maya Edward (:  1955) has requested an audio/video evaluation for the following concern(s):    VV today for follow up. Last OV with me: 2020. Due for mammogram and routine labs. Acute grief reaction/depression. Going to therapy for multiple deaths in her family--8 in the past 6 months. Feels better with therapy and prn medication. Denies worsening mood/depression. Also taking ativan 2 mg PRN for acute stress/panic. Malodor to urine. X 1 month. C/o strong malodor of urine for the past month. Denies dysuria. Staying hydrated. HTN. Stable on current medications. Denies CP, dizziness, SOB, QUINTERO. Vit D therapy  Remains on replacement therapy. Seasonal allergies. Controlled with allegra and flonase. Chronic pain. Followed by pain management. Pain is controlled with tramadol. Constipation. Intermittent. +hard stool. Most likely related to chronic pain medication--tramadol. Movantik sent for patient at last OV--not approved by insurance. Review of Systems   Constitutional: Negative for appetite change, chills, diaphoresis, fatigue, fever and unexpected weight change. HENT: Negative for congestion, postnasal drip, sinus pressure, sinus pain and sneezing. Respiratory: Negative for cough, chest tightness and shortness of breath. Cardiovascular: Negative for chest pain, palpitations and leg swelling. Gastrointestinal: Positive for constipation. Negative for abdominal distention, abdominal pain, blood in stool, diarrhea, nausea and rectal pain. Endocrine: Negative for polydipsia, polyphagia and polyuria. Genitourinary: Positive for frequency. Negative for decreased urine volume, enuresis, urgency, vaginal bleeding and vaginal discharge. Musculoskeletal: Positive for arthralgias, back pain and myalgias. Chronic   Skin: Negative for color change and rash. Allergic/Immunologic: Positive for environmental allergies. Neurological: Negative for dizziness, light-headedness and headaches. Psychiatric/Behavioral: Negative for dysphoric mood and sleep disturbance. The patient is not nervous/anxious. Prior to Visit Medications    Medication Sig Taking? Authorizing Provider   docusate sodium (COLACE) 100 MG capsule Take 1 capsule by mouth 2 times daily Yes Norma Espinosa PA-C   metoprolol succinate (TOPROL XL) 100 MG extended release tablet TAKE 1 TABLET BY MOUTH DAILY Yes Norma Espinosa PA-C   fexofenadine (ALLEGRA) 180 MG tablet Take 1 tablet by mouth daily Yes Norma Espinosa PA-C   fluticasone (FLONASE) 50 MCG/ACT nasal spray SHAKE LIQUID AND USE 1 SPRAY IN EACH NOSTRIL DAILY Yes Norma Espinosa PA-C   vitamin D (ERGOCALCIFEROL) 1.25 MG (59795 UT) CAPS capsule TAKE 1 CAPSULE BY MOUTH 1 TIME A WEEK Yes Norma Espinosa PA-C   lisinopril-hydroCHLOROthiazide (PRINZIDE;ZESTORETIC) 20-25 MG per tablet TAKE 1 TABLET BY MOUTH DAILY Yes Norma Espinosa PA-C   LORazepam (ATIVAN) 2 MG tablet Take 2 mg by mouth every 6 hours as needed for Anxiety .  Yes Historical Provider, MD   traMADol (ULTRAM) 50 MG tablet 1-2 tablet by mouth 3 times a day  for pain prn Yes Samuel Manriquez MD       Social History     Tobacco Use    Smoking status: Never Smoker    Smokeless tobacco: Never Used   Substance Use Topics    Alcohol use: No     Alcohol/week: 0.0 standard drinks    Drug use: No      Allergies   Allergen Reactions  Ibuprofen Other (See Comments)    Midazolam Hcl      Other reaction(s):  Other: See Comments  Makes her feel more anxious    Nsaids Other (See Comments)    Sulfa Antibiotics Other (See Comments)     elevated blood pressure    Sulfasalazine Other (See Comments)     elevated blood pressure   ,   Past Medical History:   Diagnosis Date    Anxiety     Chronic back pain     Hypertension     Obesity     Pain     ankle and leg   ,   Past Surgical History:   Procedure Laterality Date    CYST REMOVAL      FRACTURE SURGERY      HYSTERECTOMY     ,   Social History     Tobacco Use    Smoking status: Never Smoker    Smokeless tobacco: Never Used   Substance Use Topics    Alcohol use: No     Alcohol/week: 0.0 standard drinks    Drug use: No   ,   Family History   Problem Relation Age of Onset    Cancer Mother         esophageal    Seizures Father     Seizures Brother         back problems    Cancer Maternal Aunt         esophageal    Cancer Maternal Uncle         lung   ,   There is no immunization history on file for this patient.,   Health Maintenance   Topic Date Due    DTaP/Tdap/Td vaccine (1 - Tdap) 08/14/1976    Shingles Vaccine (1 of 2) 08/14/2007    Cervical cancer screen  04/27/2013    A1C test (Diabetic or Prediabetic)  03/19/2020    Flu vaccine (1) 09/01/2020    Potassium monitoring  10/10/2020    Creatinine monitoring  10/10/2020    Annual Wellness Visit (AWV)  08/04/2021    Breast cancer screen  10/10/2021    Lipid screen  03/19/2024    Colon cancer screen colonoscopy  05/09/2026    Hepatitis C screen  Completed    HIV screen  Completed    Hepatitis A vaccine  Aged Out    Hepatitis B vaccine  Aged Out    Hib vaccine  Aged Out    Meningococcal (ACWY) vaccine  Aged Out    Pneumococcal 0-64 years Vaccine  Aged Out       PHYSICAL EXAMINATION:  [ INSTRUCTIONS:  \"[x]\" Indicates a positive item  \"[]\" Indicates a negative item  -- DELETE ALL ITEMS NOT EXAMINED]  [x] Alert  [x] Oriented to person/place/time    [x] No apparent distress  [] Toxic appearing    [] Face flushed appearing [] Sclera clear  [] Lips are cyanotic      [x] Breathing appears normal  [] Appears tachypneic      [] Rash on visible skin    [] Cranial Nerves II-XII grossly intact    [] Motor grossly intact in visible upper extremities    [] Motor grossly intact in visible lower extremities    [x] Normal Mood  [] Anxious appearing    [] Depressed appearing  [] Confused appearing      [] Poor short term memory  [] Poor long term memory    [] OTHER:      Due to this being a TeleHealth encounter, evaluation of the following organ systems is limited: Vitals/Constitutional/EENT/Resp/CV/GI//MS/Neuro/Skin/Heme-Lymph-Imm. ASSESSMENT/PLAN:  1. Benign essential HTN  Stable on current medications. - CBC Auto Differential; Future  - Comprehensive Metabolic Panel; Future    2. Malodorous urine  Urine sample in the office when picking up lab orders.     - POCT Urinalysis no Micro    3. Vitamin D deficiency  On replacement therapy. Due to have level checked. - Vitamin D 25 Hydroxy; Future    4. Therapeutic opioid induced constipation  Movantik not approved. - docusate sodium (COLACE) 100 MG capsule; Take 1 capsule by mouth 2 times daily  Dispense: 60 capsule; Refill: 5    5. Screening cholesterol level    - Lipid Panel; Future    6. Encounter for screening mammogram for malignant neoplasm of breast    - EL DIGITAL SCREEN W OR WO CAD BILATERAL; Future    7. Arthropathy of lumbar facet joint  Followed by pain management. Tramadol for pain. 8. Anxiety  Ativan PRN. Medication filled by Nat Maria PA-C.     9. Grief reaction  Seeing therapy. Return in about 6 months (around 8/16/2021) for follow up in office/AWV . An  electronic signature was used to authenticate this note.     --Sonali Prado PA-C on 2/16/2021 at 1:32 PM        Pursuant to the emergency declaration under the [address]

## 2021-02-22 ENCOUNTER — HOSPITAL ENCOUNTER (OUTPATIENT)
Dept: WOMENS IMAGING | Age: 66
Discharge: HOME | End: 2021-02-24
Payer: MEDICARE

## 2021-02-22 DIAGNOSIS — I10 BENIGN ESSENTIAL HTN: ICD-10-CM

## 2021-02-22 DIAGNOSIS — Z13.220 SCREENING CHOLESTEROL LEVEL: ICD-10-CM

## 2021-02-22 DIAGNOSIS — Z12.31 ENCOUNTER FOR SCREENING MAMMOGRAM FOR MALIGNANT NEOPLASM OF BREAST: ICD-10-CM

## 2021-02-22 DIAGNOSIS — E55.9 VITAMIN D DEFICIENCY: ICD-10-CM

## 2021-02-22 LAB
ALBUMIN SERPL-MCNC: 4.2 G/DL (ref 3.5–4.6)
ALP BLD-CCNC: 79 U/L (ref 40–130)
ALT SERPL-CCNC: 14 U/L (ref 0–33)
ANION GAP SERPL CALCULATED.3IONS-SCNC: 11 MEQ/L (ref 9–15)
AST SERPL-CCNC: 14 U/L (ref 0–35)
BASOPHILS ABSOLUTE: 0 K/UL (ref 0–0.2)
BASOPHILS RELATIVE PERCENT: 0.3 %
BILIRUB SERPL-MCNC: 0.6 MG/DL (ref 0.2–0.7)
BUN BLDV-MCNC: 26 MG/DL (ref 8–23)
CALCIUM SERPL-MCNC: 9.9 MG/DL (ref 8.5–9.9)
CHLORIDE BLD-SCNC: 104 MEQ/L (ref 95–107)
CHOLESTEROL, TOTAL: 131 MG/DL (ref 0–199)
CO2: 27 MEQ/L (ref 20–31)
CREAT SERPL-MCNC: 1.33 MG/DL (ref 0.5–0.9)
EOSINOPHILS ABSOLUTE: 0.1 K/UL (ref 0–0.7)
EOSINOPHILS RELATIVE PERCENT: 2 %
GFR AFRICAN AMERICAN: 48.7
GFR NON-AFRICAN AMERICAN: 40.2
GLOBULIN: 3.5 G/DL (ref 2.3–3.5)
GLUCOSE BLD-MCNC: 103 MG/DL (ref 70–99)
HCT VFR BLD CALC: 37.3 % (ref 37–47)
HDLC SERPL-MCNC: 56 MG/DL (ref 40–59)
HEMOGLOBIN: 12.1 G/DL (ref 12–16)
LDL CHOLESTEROL CALCULATED: 55 MG/DL (ref 0–129)
LYMPHOCYTES ABSOLUTE: 1.9 K/UL (ref 1–4.8)
LYMPHOCYTES RELATIVE PERCENT: 28.2 %
MCH RBC QN AUTO: 28.2 PG (ref 27–31.3)
MCHC RBC AUTO-ENTMCNC: 32.5 % (ref 33–37)
MCV RBC AUTO: 86.5 FL (ref 82–100)
MONOCYTES ABSOLUTE: 0.5 K/UL (ref 0.2–0.8)
MONOCYTES RELATIVE PERCENT: 6.8 %
NEUTROPHILS ABSOLUTE: 4.2 K/UL (ref 1.4–6.5)
NEUTROPHILS RELATIVE PERCENT: 62.7 %
PDW BLD-RTO: 13.6 % (ref 11.5–14.5)
PLATELET # BLD: 265 K/UL (ref 130–400)
POTASSIUM SERPL-SCNC: 4 MEQ/L (ref 3.4–4.9)
RBC # BLD: 4.31 M/UL (ref 4.2–5.4)
SODIUM BLD-SCNC: 142 MEQ/L (ref 135–144)
TOTAL PROTEIN: 7.7 G/DL (ref 6.3–8)
TRIGL SERPL-MCNC: 101 MG/DL (ref 0–150)
VITAMIN D 25-HYDROXY: 35.2 NG/ML (ref 30–100)
WBC # BLD: 6.7 K/UL (ref 4.8–10.8)

## 2021-02-22 PROCEDURE — 77067 SCR MAMMO BI INCL CAD: CPT

## 2021-02-23 ENCOUNTER — TELEPHONE (OUTPATIENT)
Dept: FAMILY MEDICINE CLINIC | Age: 66
End: 2021-02-23

## 2021-02-23 DIAGNOSIS — I10 BENIGN ESSENTIAL HTN: Primary | ICD-10-CM

## 2021-02-23 DIAGNOSIS — N18.2 STAGE 2 CHRONIC KIDNEY DISEASE: ICD-10-CM

## 2021-02-23 NOTE — TELEPHONE ENCOUNTER
Yes, continue medications. I referred her to Dr. Maddy Tabares just for assessment. I am not concerned at all, I just want to make sure things are controlled and continue that way.

## 2021-02-23 NOTE — TELEPHONE ENCOUNTER
Patient calling back in regards to her recent labs. She had some concerns in regards to her lab work. I reassured her there is nothing to worry about and labs look good. She states she does not follow a kidney specialist and would also like to know if she should continue to take her water pill because she is urinating often.      Please advise

## 2021-02-26 ENCOUNTER — TELEPHONE (OUTPATIENT)
Dept: FAMILY MEDICINE CLINIC | Age: 66
End: 2021-02-26

## 2021-02-26 NOTE — TELEPHONE ENCOUNTER
Patient is calling to get antibiotics, she went to a different urgent care and she was told that she needed to call her pcp to get it. Was walking on tight boots and the top of her left foot is swollen now. I explained to her that she needed to be seen for antibiotics at our [facility] but she refused stating that she is in [facility] [employer] at the moment. Please advise and thanks!

## 2021-03-03 ENCOUNTER — HOSPITAL ENCOUNTER (EMERGENCY)
Age: 66
Discharge: HOME | End: 2021-03-03
Payer: MEDICARE

## 2021-03-03 ENCOUNTER — APPOINTMENT (OUTPATIENT)
Dept: GENERAL RADIOLOGY | Age: 66
End: 2021-03-03
Payer: MEDICARE

## 2021-03-03 VITALS
HEIGHT: 71 IN | WEIGHT: 220 LBS | SYSTOLIC BLOOD PRESSURE: 128 MMHG | TEMPERATURE: 97.9 F | RESPIRATION RATE: 18 BRPM | HEART RATE: 72 BPM | DIASTOLIC BLOOD PRESSURE: 79 MMHG | BODY MASS INDEX: 30.8 KG/M2 | OXYGEN SATURATION: 98 %

## 2021-03-03 DIAGNOSIS — M10.072 ACUTE IDIOPATHIC GOUT OF LEFT FOOT: Primary | ICD-10-CM

## 2021-03-03 LAB — URIC ACID, SERUM: 9 MG/DL (ref 2.4–5.7)

## 2021-03-03 PROCEDURE — 84550 ASSAY OF BLOOD/URIC ACID: CPT

## 2021-03-03 PROCEDURE — 99283 EMERGENCY DEPT VISIT LOW MDM: CPT

## 2021-03-03 PROCEDURE — 36415 COLL VENOUS BLD VENIPUNCTURE: CPT

## 2021-03-03 PROCEDURE — 6370000000 HC RX 637 (ALT 250 FOR IP): Performed by: PHYSICIAN ASSISTANT

## 2021-03-03 PROCEDURE — 73630 X-RAY EXAM OF FOOT: CPT

## 2021-03-03 RX ORDER — COLCHICINE 0.6 MG/1
1.2 TABLET ORAL ONCE
Status: COMPLETED | OUTPATIENT
Start: 2021-03-03 | End: 2021-03-03

## 2021-03-03 RX ORDER — ACETAMINOPHEN 325 MG/1
650 TABLET ORAL ONCE
Status: COMPLETED | OUTPATIENT
Start: 2021-03-03 | End: 2021-03-03

## 2021-03-03 RX ORDER — METHYLPREDNISOLONE 4 MG/1
TABLET ORAL
Qty: 1 KIT | Refills: 0 | Status: SHIPPED | OUTPATIENT
Start: 2021-03-03 | End: 2021-03-09

## 2021-03-03 RX ORDER — COLCHICINE 0.6 MG/1
0.6 TABLET ORAL 2 TIMES DAILY
Qty: 14 TABLET | Refills: 0 | Status: SHIPPED | OUTPATIENT
Start: 2021-03-03 | End: 2021-03-11 | Stop reason: ALTCHOICE

## 2021-03-03 RX ADMIN — ACETAMINOPHEN 650 MG: 325 TABLET ORAL at 13:40

## 2021-03-03 RX ADMIN — COLCHICINE 1.2 MG: 0.6 TABLET, FILM COATED ORAL at 13:41

## 2021-03-03 ASSESSMENT — ENCOUNTER SYMPTOMS
GASTROINTESTINAL NEGATIVE: 1
EYES NEGATIVE: 1
RESPIRATORY NEGATIVE: 1

## 2021-03-03 ASSESSMENT — PAIN DESCRIPTION - LOCATION
LOCATION: FOOT
LOCATION: FOOT;TOE (COMMENT WHICH ONE)

## 2021-03-03 ASSESSMENT — PAIN DESCRIPTION - DESCRIPTORS: DESCRIPTORS: ACHING

## 2021-03-03 ASSESSMENT — PAIN DESCRIPTION - PAIN TYPE: TYPE: ACUTE PAIN

## 2021-03-03 ASSESSMENT — PAIN SCALES - GENERAL
PAINLEVEL_OUTOF10: 5
PAINLEVEL_OUTOF10: 4

## 2021-03-03 ASSESSMENT — PAIN DESCRIPTION - FREQUENCY: FREQUENCY: CONTINUOUS

## 2021-03-03 NOTE — ED NOTES
Straight draw performed. Pt's blood specimen collected. Tubes labeled and sent to lab via tube system.        Caleb De Los Santos RN  03/03/21 [id number]

## 2021-03-03 NOTE — ED TRIAGE NOTES
Patient c/o left foot/toe pain, swelling, redness, started last Monday. Patient denies any injury. Patient states her daughter googled it and told her she needed to be seen because it's gout.

## 2021-03-03 NOTE — ED PROVIDER NOTES
[facility] ED  eMERGENCY dEPARTMENT eNCOUnter      Pt Name: Vanesa Liao  MRN: [id number]  [place] [birth year]  Date of evaluation: 3/3/2021  Provider: Daphne Summesr PA-C      HISTORY OF PRESENT ILLNESS    Vanesa Liao is a 63 y.o. female who presents to the Emergency Department with chief complaint of left foot and ankle pain. Patient states that started about 8 days ago and was the most painful in her left great toe. Patient states it was red and swollen. Patient states that significantly better today, but is not resolved completely. Patient does not have a history of gout that she is aware of. She has not taken anything for pain prior to arrival.  Patient denies any specific injury or trauma other than noting wearing boots while walking around at [employer] 24 hours prior to symptom onset. Patient has no other concerns at this time. REVIEW OF SYSTEMS       Review of Systems   Constitutional: Negative. HENT: Negative. Eyes: Negative. Respiratory: Negative. Cardiovascular: Negative. Gastrointestinal: Negative. Endocrine: Negative. Genitourinary: Negative. Musculoskeletal: Positive for arthralgias. Skin: Negative. Neurological: Negative. Psychiatric/Behavioral: Negative.           PAST MEDICAL HISTORY     Past Medical History:   Diagnosis Date    Anxiety     Chronic back pain     Hypertension     Obesity     Pain     ankle and leg         SURGICAL HISTORY       Past Surgical History:   Procedure Laterality Date    BREAST SURGERY      CYST REMOVAL      FRACTURE SURGERY      HYSTERECTOMY           CURRENT MEDICATIONS       Previous Medications    DOCUSATE SODIUM (COLACE) 100 MG CAPSULE    Take 1 capsule by mouth 2 times daily    FEXOFENADINE (ALLEGRA) 180 MG TABLET    Take 1 tablet by mouth daily    FLUTICASONE (FLONASE) 50 MCG/ACT NASAL SPRAY    SHAKE LIQUID AND USE 1 SPRAY IN EACH NOSTRIL DAILY    LISINOPRIL-HYDROCHLOROTHIAZIDE (PRINZIDE;ZESTORETIC) 20-25 MG PER TABLET    TAKE 1 TABLET BY MOUTH DAILY    LORAZEPAM (ATIVAN) 2 MG TABLET    Take 2 mg by mouth every 6 hours as needed for Anxiety .     METOPROLOL SUCCINATE (TOPROL XL) 100 MG EXTENDED RELEASE TABLET    TAKE 1 TABLET BY MOUTH DAILY    TRAMADOL (ULTRAM) 50 MG TABLET    1-2 tablet by mouth 3 times a day  for pain prn    VITAMIN D (ERGOCALCIFEROL) 1.25 MG (21878 UT) CAPS CAPSULE    TAKE 1 CAPSULE BY MOUTH 1 TIME A WEEK       ALLERGIES     Ibuprofen, Midazolam hcl, Nsaids, Sulfa antibiotics, and Sulfasalazine    FAMILY HISTORY       Family History   Problem Relation Age of Onset    Cancer Mother         esophageal    Seizures Father     Seizures Brother         back problems    Cancer Maternal Aunt         esophageal    Cancer Maternal Uncle         lung          SOCIAL HISTORY       Social History     Socioeconomic History    Marital status:      Spouse name: None    Number of children: None    Years of education: None    Highest education level: None   Occupational History    None   Social Needs    Financial resource strain: None    Food insecurity     Worry: None     Inability: None    Transportation needs     Medical: None     Non-medical: None   Tobacco Use    Smoking status: Never Smoker    Smokeless tobacco: Never Used   Substance and Sexual Activity    Alcohol use: No     Alcohol/week: 0.0 standard drinks    Drug use: No    Sexual activity: Yes     Comment: divorce   Lifestyle    Physical activity     Days per week: None     Minutes per session: None    Stress: None   Relationships    Social connections     Talks on phone: None     Gets together: None     Attends Oriental orthodox service: None     Active member of club or organization: None     Attends meetings of clubs or organizations: None     Relationship status: None    Intimate partner violence     Fear of current or ex partner: None     Emotionally abused: None     Physically abused: None Forced sexual activity: None   Other Topics Concern    None   Social History Narrative    None       SCREENINGS      @FLOW([id number])@      PHYSICAL EXAM    (up to 7 for level 4, 8 or more for level 5)     ED Triage Vitals [03/03/21 1204]   BP Temp Temp src Pulse Resp SpO2 Height Weight   126/83 96.8 °F (36 °C) -- 66 18 98 % 5' 11\" (1.803 m) 220 lb (99.8 kg)       Physical Exam  Constitutional:       General: She is not in acute distress. Appearance: She is well-developed. HENT:      Head: Normocephalic and atraumatic. Eyes:      Conjunctiva/sclera: Conjunctivae normal.      Pupils: Pupils are equal, round, and reactive to light. Neck:      Musculoskeletal: Normal range of motion and neck supple. Cardiovascular:      Rate and Rhythm: Normal rate and regular rhythm. Heart sounds: No murmur. Pulmonary:      Effort: No respiratory distress. Breath sounds: Normal breath sounds. No wheezing or rales. Abdominal:      General: There is no distension. Palpations: Abdomen is soft. Tenderness: There is no abdominal tenderness. Musculoskeletal: Normal range of motion. Left ankle: She exhibits swelling. Tenderness. Lateral malleolus tenderness found. Left foot: Tenderness and bony tenderness present. Feet:    Skin:     General: Skin is warm and dry. Findings: No erythema or rash. Neurological:      Mental Status: She is alert and oriented to person, place, and time. Cranial Nerves: No cranial nerve deficit. Psychiatric:         Judgment: Judgment normal.           All other labs were within normal range or not returned as of this dictation. EMERGENCY DEPARTMENT COURSE and DIFFERENTIALDIAGNOSIS/MDM:   Vitals:    Vitals:    03/03/21 1204   BP: 126/83   Pulse: 66   Resp: 18   Temp: 96.8 °F (36 °C)   SpO2: 98%   Weight: 220 lb (99.8 kg)   Height: 5' 11\" (1.803 m)        X-ray left foot is negative for acute abnormality.   Uric acid level is significantly elevated at 9.0. Patient started on colchicine and also given Tylenol while in the emergency room. Patient has allergy to NSAIDs. Patient will be kept on a short course of colchicine as well as Medrol Dosepak for initial therapy and is advised to follow-up with family doctor in the next few days. Patient verbalizes understanding of plan discharge is no further questions. Return here if symptoms worsen or if new concerning symptoms arise. PROCEDURES:  Unless otherwise noted below, none     Procedures      FINAL IMPRESSION      1.  Acute idiopathic gout of left foot          DISPOSITION/PLAN   DISPOSITION            Yuki Delacruz PA-C (electronically signed)  Attending Emergency Physician  [facility] Marty Brown PA-C  03/03/21 2330

## 2021-03-11 ENCOUNTER — VIRTUAL VISIT (OUTPATIENT)
Dept: FAMILY MEDICINE CLINIC | Age: 66
End: 2021-03-11
Payer: MEDICARE

## 2021-03-11 DIAGNOSIS — E79.0 HYPERURICEMIA: ICD-10-CM

## 2021-03-11 DIAGNOSIS — N18.31 STAGE 3A CHRONIC KIDNEY DISEASE (HCC): ICD-10-CM

## 2021-03-11 DIAGNOSIS — M10.9 ACUTE GOUT OF FOOT, UNSPECIFIED CAUSE, UNSPECIFIED LATERALITY: Primary | ICD-10-CM

## 2021-03-11 DIAGNOSIS — I10 BENIGN ESSENTIAL HTN: ICD-10-CM

## 2021-03-11 PROBLEM — R82.90 MALODOROUS URINE: Status: RESOLVED | Noted: 2021-02-16 | Resolved: 2021-03-11

## 2021-03-11 PROCEDURE — 99214 OFFICE O/P EST MOD 30 MIN: CPT | Performed by: PHYSICIAN ASSISTANT

## 2021-03-11 RX ORDER — LISINOPRIL 20 MG/1
20 TABLET ORAL DAILY
Qty: 90 TABLET | Refills: 1
Start: 2021-03-11 | End: 2021-05-13 | Stop reason: SDUPTHER

## 2021-03-11 RX ORDER — ALLOPURINOL 100 MG/1
100 TABLET ORAL DAILY
Qty: 90 TABLET | Refills: 1 | Status: SHIPPED | OUTPATIENT
Start: 2021-03-11 | End: 2021-08-15

## 2021-03-11 ASSESSMENT — ENCOUNTER SYMPTOMS
COLOR CHANGE: 0
NAUSEA: 0
BACK PAIN: 1
SHORTNESS OF BREATH: 0
SINUS PAIN: 0
BLOOD IN STOOL: 0
RECTAL PAIN: 0
SINUS PRESSURE: 0
DIARRHEA: 0
ABDOMINAL PAIN: 0
CHEST TIGHTNESS: 0
ABDOMINAL DISTENTION: 0
COUGH: 0

## 2021-03-11 NOTE — PATIENT INSTRUCTIONS
Gout: Care Instructions  Overview     Gout is a form of arthritis caused by a buildup of uric acid crystals in a joint. It causes sudden attacks of pain, swelling, redness, and stiffness, usually in one joint, especially the big toe. Gout usually comes on without a cause. But it can be brought on by drinking alcohol (especially beer), eating or drinking things made with high-fructose corn syrup, or eating seafood or red meat. Taking certain medicines, such as diuretics, can also trigger an attack of gout. Taking your medicines as prescribed and following up with your doctor regularly can help you avoid gout attacks in the future. Follow-up care is a key part of your treatment and safety. Be sure to make and go to all appointments, and call your doctor if you are having problems. It's also a good idea to know your test results and keep a list of the medicines you take. How can you care for yourself at home? · If the joint is swollen, put ice or a cold pack on the area for 10 to 20 minutes at a time. Put a thin cloth between the ice and your skin. · Prop up the sore limb on a pillow when you ice it or anytime you sit or lie down during the next 3 days. Try to keep it above the level of your heart. This can help reduce swelling. · Rest sore joints. Avoid activities that put weight or strain on the joints for a few days. Take short rest breaks from your regular activities during the day. · Take your medicines exactly as prescribed. Call your doctor if you think you are having a problem with your medicine. · Take pain medicines exactly as directed. ? If the doctor gave you a prescription medicine for pain, take it as prescribed. ? If you are not taking a prescription pain medicine, ask your doctor if you can take an over-the-counter medicine. · Eat less seafood and red meat. · Avoid foods or drinks that are made with high-fructose corn syrup. · Check with your doctor before drinking alcohol.   · Losing weight, if you are overweight, may help reduce attacks of gout. But do not go on a diet that causes rapid weight loss. Losing a lot of weight in a short amount of time can cause a gout attack. When should you call for help? Call your doctor now or seek immediate medical care if:    · You have a fever.     · The joint is so painful you cannot use it.     · You have sudden, unexplained swelling, redness, warmth, or severe pain in one or more joints. Watch closely for changes in your health, and be sure to contact your doctor if:    · You have joint pain.     · Your symptoms get worse or are not improving after 2 or 3 days. Where can you learn more? Go to https://[employer]. org and sign in to your [employer] account. Enter X639 in the [place] box to learn more about \"Gout: Care Instructions. \"     If you do not have an account, please click on the \"Sign Up Now\" link. Current as of: August 5, 2020               Content Version: 12.8  © 2006-2021 [employer]wise, Inc[employer]. Care instructions adapted under license by [facility] [place] ([facility]). If you have questions about a medical condition or this instruction, always ask your healthcare professional. [place] [address] any warranty or liability for your use of this information.

## 2021-03-11 NOTE — PROGRESS NOTES
3/11/2021    TELEHEALTH EVALUATION -- Audio/Visual (During XNCXI-15 public health emergency)    Due to [place] 19 outbreak, patient's office visit was converted to a virtual visit. Patient was contacted and agreed to proceed with a virtual visit via Telephone Visit--total length of call 20 minutes. The risks and benefits of converting to a virtual visit were discussed in light of the current infectious disease epidemic. Patient also understood that insurance coverage and co-pays are up to their individual insurance plans. HPI:    Arnulfo Coughlin (:  1955) has requested an audio/video evaluation for the following concern(s):    VV today for recent ED visit. Last OV with me: 2021 VV for follow up. ED follow up  Presented to [facility] ED on 3/3/2021 for acute pain of L foot. Woke up Monday morning with sudden erythema, pain and swelling. No trauma/injury/fall. Xray of L foot--no acute osseous abnormality. Mild soft tissue swelling of forefoot, noted. She was discharged home in stable condition with Rx for colchicine. Foot pain has improved. Reviewed medication list--history of taking HCTZ; however, patient states that she is no longer taking dual lisinopril/hctz, just 20 mg lisinopril. Admits to poor water intake--has been trying to increase water amount throughout the day. Results for orders placed or performed during the hospital encounter of 21   URIC ACID   Result Value Ref Range    Uric Acid, Serum 9.0 (H) 2.4 - 5.7 mg/dL     Renal insufficiency. Did not follow up/establish with nephrology. Would like to discuss labs from 2021. Avoiding nephrotoxins; denies excessive NSAID use. Admits to poor water intake. +hyperuricemia. Blood pressure remains controlled. Review of Systems   Constitutional: Negative for appetite change, chills, diaphoresis, fatigue, fever and unexpected weight change.    HENT: Negative for congestion, postnasal drip, sinus pressure, sinus pain and sneezing. Respiratory: Negative for cough, chest tightness and shortness of breath. Cardiovascular: Negative for chest pain, palpitations and leg swelling. Gastrointestinal: Negative for abdominal distention, abdominal pain, blood in stool, diarrhea, nausea and rectal pain. Endocrine: Negative for polydipsia, polyphagia and polyuria. Genitourinary: Positive for frequency (nocturnal, per patient). Negative for decreased urine volume, enuresis, urgency, vaginal bleeding and vaginal discharge. Musculoskeletal: Positive for arthralgias, back pain and myalgias. Chronic   Skin: Negative for color change and rash. Allergic/Immunologic: Positive for environmental allergies. Neurological: Negative for dizziness, light-headedness and headaches. Psychiatric/Behavioral: Negative for dysphoric mood and sleep disturbance. The patient is not nervous/anxious. Prior to Visit Medications    Medication Sig Taking? Authorizing Provider   lisinopril (PRINIVIL;ZESTRIL) 20 MG tablet Take 1 tablet by mouth daily Yes Norma Espinosa PA-C   allopurinol (ZYLOPRIM) 100 MG tablet Take 1 tablet by mouth daily Yes Norma Espinosa PA-C   docusate sodium (COLACE) 100 MG capsule Take 1 capsule by mouth 2 times daily Yes Norma Espinosa PA-C   metoprolol succinate (TOPROL XL) 100 MG extended release tablet TAKE 1 TABLET BY MOUTH DAILY Yes Norma Espinosa PA-C   fexofenadine (ALLEGRA) 180 MG tablet Take 1 tablet by mouth daily Yes Norma Espinosa PA-C   fluticasone (FLONASE) 50 MCG/ACT nasal spray SHAKE LIQUID AND USE 1 SPRAY IN EACH NOSTRIL DAILY Yes Norma Espinosa PA-C   vitamin D (ERGOCALCIFEROL) 1.25 MG (58577 UT) CAPS capsule TAKE 1 CAPSULE BY MOUTH 1 TIME A WEEK Yes Norma Espinosa PA-C   LORazepam (ATIVAN) 2 MG tablet Take 2 mg by mouth every 6 hours as needed for Anxiety .  Yes Historical Provider, MD   traMADol (ULTRAM) 50 MG tablet 1-2 tablet by mouth 3 times a day  for pain prn Yes Teddy ORTIZ Anuja Pulido MD       Social History     Tobacco Use    Smoking status: Never Smoker    Smokeless tobacco: Never Used   Substance Use Topics    Alcohol use: No     Alcohol/week: 0.0 standard drinks    Drug use: No      Allergies   Allergen Reactions    Ibuprofen Other (See Comments)    Midazolam Hcl      Other reaction(s):  Other: See Comments  Makes her feel more anxious    Nsaids Other (See Comments)    Sulfa Antibiotics Other (See Comments)     elevated blood pressure    Sulfasalazine Other (See Comments)     elevated blood pressure   ,   Past Medical History:   Diagnosis Date    Anxiety     Chronic back pain     Hypertension     Obesity     Pain     ankle and leg   ,   Past Surgical History:   Procedure Laterality Date    BREAST SURGERY      CYST REMOVAL      FRACTURE SURGERY      HYSTERECTOMY     ,   Social History     Tobacco Use    Smoking status: Never Smoker    Smokeless tobacco: Never Used   Substance Use Topics    Alcohol use: No     Alcohol/week: 0.0 standard drinks    Drug use: No   ,   Family History   Problem Relation Age of Onset    Cancer Mother         esophageal    Seizures Father     Seizures Brother         back problems    Cancer Maternal Aunt         esophageal    Cancer Maternal Uncle         lung   ,   There is no immunization history on file for this patient.,   Health Maintenance   Topic Date Due    COVID-19 Vaccine (1 of 2) Never done    DTaP/Tdap/Td vaccine (1 - Tdap) Never done    Shingles Vaccine (1 of 2) Never done    Cervical cancer screen  04/27/2013    A1C test (Diabetic or Prediabetic)  03/19/2020    Flu vaccine (1) Never done   [employer] Visit (AWV)  08/04/2021    Potassium monitoring  02/22/2022    Creatinine monitoring  02/22/2022    Breast cancer screen  02/22/2023    Lipid screen  02/22/2026    Colon cancer screen colonoscopy  05/09/2026    Hepatitis C screen  Completed    HIV screen  Completed    Hepatitis A vaccine  Aged C/ [place] 19 Hepatitis B vaccine  Aged Out    Hib vaccine  Aged Out    Meningococcal (ACWY) vaccine  Aged Out    Pneumococcal 0-64 years Vaccine  Aged Out       PHYSICAL EXAMINATION:  [ INSTRUCTIONS:  \"[x]\" Indicates a positive item  \"[]\" Indicates a negative item  -- DELETE ALL ITEMS NOT EXAMINED]  [x] Alert  [x] Oriented to person/place/time    [x] No apparent distress  [] Toxic appearing    [] Face flushed appearing [] Sclera clear  [] Lips are cyanotic      [x] Breathing appears normal  [] Appears tachypneic      [] Rash on visible skin    [] Cranial Nerves II-XII grossly intact    [] Motor grossly intact in visible upper extremities    [] Motor grossly intact in visible lower extremities    [x] Normal Mood  [] Anxious appearing    [] Depressed appearing  [] Confused appearing      [] Poor short term memory  [] Poor long term memory    [] OTHER:      Due to this being a TeleHealth encounter, evaluation of the following organ systems is limited: Vitals/Constitutional/EENT/Resp/CV/GI//MS/Neuro/Skin/Heme-Lymph-Imm. ASSESSMENT/PLAN:  1. Acute gout of foot, unspecified cause, unspecified laterality  Start allopurinol  Monitor. 2. Benign essential HTN  Continue BP medication. 3. Hyperuricemia  Allopurinol initiated. 4. Stage 3a chronic kidney disease  Needs to establish with nephrology. Discussed today with patient. Return in about 6 weeks (around 4/22/2021) for follow up in office. An  electronic signature was used to authenticate this note. --Justin Marin PA-C on 3/11/2021 at 1:18 PM        Pursuant to the emergency declaration under the [facility], [facility]5 waiver authority and the [employer] and [employer] Act, this Virtual  Visit was conducted, with patient's consent, to reduce the patient's risk of exposure to COVID-19 and provide continuity of care for an established patient.     Services were provided through a video

## 2021-03-18 PROBLEM — Z12.31 ENCOUNTER FOR SCREENING MAMMOGRAM FOR MALIGNANT NEOPLASM OF BREAST: Status: RESOLVED | Noted: 2021-02-16 | Resolved: 2021-03-18

## 2021-05-13 RX ORDER — LISINOPRIL 20 MG/1
20 TABLET ORAL DAILY
Qty: 90 TABLET | Refills: 1 | Status: SHIPPED | OUTPATIENT
Start: 2021-05-13 | End: 2021-06-21 | Stop reason: SDUPTHER

## 2021-05-13 NOTE — TELEPHONE ENCOUNTER
Rx request   Requested Prescriptions     Pending Prescriptions Disp Refills    lisinopril (PRINIVIL;ZESTRIL) 20 MG tablet 90 tablet 1     Sig: Take 1 tablet by mouth daily     LOV 3/11/2021  Next Visit Date:  Future Appointments   Date Time Provider Joseph Roblero   5/24/2021  2:30 PM MADELAINE Stock   8/17/2021  3:30 PM MADELAINE Stock

## 2021-05-24 ENCOUNTER — OFFICE VISIT (OUTPATIENT)
Dept: FAMILY MEDICINE CLINIC | Age: 66
End: 2021-05-24
Payer: MEDICARE

## 2021-05-24 VITALS
DIASTOLIC BLOOD PRESSURE: 88 MMHG | OXYGEN SATURATION: 98 % | RESPIRATION RATE: 18 BRPM | HEART RATE: 86 BPM | TEMPERATURE: 96.4 F | BODY MASS INDEX: 31.8 KG/M2 | SYSTOLIC BLOOD PRESSURE: 138 MMHG | WEIGHT: 228 LBS

## 2021-05-24 DIAGNOSIS — N18.31 STAGE 3A CHRONIC KIDNEY DISEASE (HCC): ICD-10-CM

## 2021-05-24 DIAGNOSIS — I10 BENIGN ESSENTIAL HTN: ICD-10-CM

## 2021-05-24 DIAGNOSIS — E79.0 HYPERURICEMIA: ICD-10-CM

## 2021-05-24 DIAGNOSIS — F43.0 PANIC STATE AS ACUTE REACTION TO STRESS: ICD-10-CM

## 2021-05-24 DIAGNOSIS — F43.21 GRIEF REACTION: ICD-10-CM

## 2021-05-24 DIAGNOSIS — F41.9 ANXIETY: Primary | ICD-10-CM

## 2021-05-24 PROBLEM — M10.9 ACUTE GOUT OF FOOT: Status: RESOLVED | Noted: 2021-03-11 | Resolved: 2021-05-24

## 2021-05-24 LAB
ANION GAP SERPL CALCULATED.3IONS-SCNC: 6 MEQ/L (ref 9–15)
BUN BLDV-MCNC: 9 MG/DL (ref 8–23)
CALCIUM SERPL-MCNC: 9 MG/DL (ref 8.5–9.9)
CHLORIDE BLD-SCNC: 103 MEQ/L (ref 95–107)
CO2: 27 MEQ/L (ref 20–31)
CREAT SERPL-MCNC: 0.97 MG/DL (ref 0.5–0.9)
GFR AFRICAN AMERICAN: >60
GFR NON-AFRICAN AMERICAN: 57.9
GLUCOSE BLD-MCNC: 85 MG/DL (ref 70–99)
POTASSIUM SERPL-SCNC: 3.9 MEQ/L (ref 3.4–4.9)
SODIUM BLD-SCNC: 136 MEQ/L (ref 135–144)
URIC ACID, SERUM: 6.8 MG/DL (ref 2.4–5.7)

## 2021-05-24 PROCEDURE — 99214 OFFICE O/P EST MOD 30 MIN: CPT | Performed by: PHYSICIAN ASSISTANT

## 2021-05-24 RX ORDER — TRAZODONE HYDROCHLORIDE 50 MG/1
TABLET ORAL
COMMUNITY
Start: 2021-02-25 | End: 2021-05-24

## 2021-05-24 RX ORDER — LORAZEPAM 2 MG/1
2 TABLET ORAL EVERY 8 HOURS PRN
Qty: 90 TABLET | Refills: 0 | Status: SHIPPED | OUTPATIENT
Start: 2021-05-24 | End: 2021-06-21 | Stop reason: SDUPTHER

## 2021-05-24 RX ORDER — PREGABALIN 100 MG/1
100 CAPSULE ORAL 2 TIMES DAILY
COMMUNITY
Start: 2021-04-14 | End: 2022-08-22

## 2021-05-24 RX ORDER — CARIPRAZINE 1.5 MG/1
CAPSULE, GELATIN COATED ORAL
COMMUNITY
Start: 2021-02-24 | End: 2021-05-24

## 2021-05-24 ASSESSMENT — ENCOUNTER SYMPTOMS
CHEST TIGHTNESS: 0
SINUS PAIN: 0
BACK PAIN: 1
NAUSEA: 0
DIARRHEA: 0
RECTAL PAIN: 0
SINUS PRESSURE: 0
BLOOD IN STOOL: 0
COUGH: 0
ABDOMINAL DISTENTION: 0
SHORTNESS OF BREATH: 0
COLOR CHANGE: 0
ABDOMINAL PAIN: 0

## 2021-05-24 NOTE — PROGRESS NOTES
Subjective  Eulas Apley, 63 y.o. female presents today with:  Chief Complaint   Patient presents with   [id number] here to discuss recent lab work done and lab work done at [address] [place] is in the office today for follow up visit. Last OV with me: 3/11/2021 via VV. Hyperuricemia  Unsure if she is taking allopurinol daily. Medication doesn't sound familiar to her. Noted elevated uric acid of 9 on 3/3/2021. History of chronic joint pain. Does not drink alcohol. History of slightly diminished renal function. BERT/grief reaction. Was seeing [facility] for anxiety. Taking ativan 2 mg, TID. Has been on medication for years. Would like to see different behavioral health--has seen multiple providers over the past couple of months due to staffing change. Feels frustrated. Wants to see therapy and psychiatrist.   Would like to form good relationship/trust with provider--this has been difficult. Needs refill of Ativan. HTN. Complaint with medication regimen. Denies chest pain, dizziness, QUINTERO, SOB. CKD stage 3. Slightly diminished renal function. BP remains controlled. Avoiding nephrotoxins. Has not seen nephrology in the past.     Review of Systems   Constitutional: Negative for appetite change, chills, diaphoresis, fatigue, fever and unexpected weight change. HENT: Negative for congestion, postnasal drip, sinus pressure, sinus pain and sneezing. Respiratory: Negative for cough, chest tightness and shortness of breath. Cardiovascular: Negative for chest pain, palpitations and leg swelling. Gastrointestinal: Negative for abdominal distention, abdominal pain, blood in stool, diarrhea, nausea and rectal pain. Endocrine: Negative for polydipsia, polyphagia and polyuria. Genitourinary: Positive for frequency (nocturnal, per patient). Negative for decreased urine volume, enuresis, urgency, vaginal bleeding and vaginal discharge. Musculoskeletal: Positive for arthralgias, back pain and myalgias. Negative for gait problem and joint swelling. Chronic   Skin: Negative for color change and rash. Allergic/Immunologic: Positive for environmental allergies. Neurological: Negative for dizziness, light-headedness and headaches. Psychiatric/Behavioral: Negative for agitation, dysphoric mood, self-injury, sleep disturbance and suicidal ideas. The patient is nervous/anxious. Past Medical History:   Diagnosis Date    Anxiety     Chronic back pain     Hypertension     Obesity     Pain     ankle and leg     Past Surgical History:   Procedure Laterality Date    BREAST SURGERY      CYST REMOVAL      FRACTURE SURGERY      HYSTERECTOMY       Social History     Socioeconomic History    Marital status:      Spouse name: Not on file    Number of children: Not on file    Years of education: Not on file    Highest education level: Not on file   Occupational History    Not on file   Tobacco Use    Smoking status: Never Smoker    Smokeless tobacco: Never Used   Substance and Sexual Activity    Alcohol use: No     Alcohol/week: 0.0 standard drinks    Drug use: No    Sexual activity: Yes     Comment: divorce   Other Topics Concern    Not on file   Social History Narrative    Not on file     Social Determinants of Health     Financial Resource Strain:     Difficulty of Paying Living Expenses:    Food Insecurity:     Worried About Running Out of Food in the Last Year:     Ran Out of Food in the Last Year:    Transportation Needs:     Lack of Transportation (Medical):      Lack of Transportation (Non-Medical):    Physical Activity:     Days of Exercise per Week:     Minutes of Exercise per Session:    Stress:     Feeling of Stress :    Social Connections:     Frequency of Communication with Friends and Family:     Frequency of Social Gatherings with Friends and Family:     Attends Baptism Services:     Active 1433   BP: 138/88   Pulse: 86   Resp: 18   Temp: 96.4 °F (35.8 °C)   TempSrc: Temporal   SpO2: 98%   Weight: 228 lb (103.4 kg)     BP Readings from Last 3 Encounters:   05/24/21 138/88   03/03/21 128/79   03/19/19 118/78     Wt Readings from Last 3 Encounters:   05/24/21 228 lb (103.4 kg)   03/03/21 220 lb (99.8 kg)   03/19/19 221 lb (100.2 kg)     Physical Exam  Vitals reviewed. Constitutional:       General: She is not in acute distress. Appearance: She is well-developed. She is not diaphoretic. Comments: Obese body habitus; Sitting comfortably in exam room. NAD. HENT:      Head: Normocephalic and atraumatic. Right Ear: External ear normal.      Left Ear: External ear normal.      Nose: Nose normal.      Mouth/Throat:      Mouth: Mucous membranes are moist.      Pharynx: No oropharyngeal exudate. Eyes:      Conjunctiva/sclera: Conjunctivae normal.   Neck:      Thyroid: No thyromegaly. Trachea: No tracheal deviation. Cardiovascular:      Rate and Rhythm: Normal rate and regular rhythm. Heart sounds: Normal heart sounds. No murmur heard. Pulmonary:      Effort: Pulmonary effort is normal. No respiratory distress. Breath sounds: Normal breath sounds. No wheezing or rales. Musculoskeletal:         General: Normal range of motion. Cervical back: Normal range of motion and neck supple. No rigidity. Lymphadenopathy:      Cervical: No cervical adenopathy. Skin:     General: Skin is warm and dry. Findings: No erythema or rash. Neurological:      Mental Status: She is alert and oriented to person, place, and time. Psychiatric:         Mood and Affect: Mood is anxious. Speech: Speech normal.         Behavior: Behavior normal.         Thought Content: Thought content normal.         Judgment: Judgment normal.      Comments: Reports having a lot of anxiety around the health of her family.   Has had several family members pass (10 in the past year) over the past couple of years due to their chronic medical conditions and complications from Siria. Increased anxiety/grief. Assessment & Plan   Mayur Biswas was seen today for discuss labs. Diagnoses and all orders for this visit:    Anxiety  -     External Referral to Psychiatry  -     LORazepam (ATIVAN) 2 MG tablet; Take 1 tablet by mouth every 8 hours as needed for Anxiety for up to 30 days. Grief reaction  -     External Referral to Psychiatry  -     LORazepam (ATIVAN) 2 MG tablet; Take 1 tablet by mouth every 8 hours as needed for Anxiety for up to 30 days. Panic state as acute reaction to stress  -     External Referral to Psychiatry  -     LORazepam (ATIVAN) 2 MG tablet; Take 1 tablet by mouth every 8 hours as needed for Anxiety for up to 30 days. Benign essential HTN  -     Basic Metabolic Panel; Future    Hyperuricemia  -     Uric Acid; Future    Stage 3a chronic kidney disease    Repeat labs today. OK to refill ativan until patient establishes with psych. 3 month follow up with me. Orders Placed This Encounter   Procedures    Basic Metabolic Panel     Standing Status:   Future     Standing Expiration Date:   5/24/2022    Uric Acid     Standing Status:   Future     Standing Expiration Date:   5/24/2022    External Referral to Psychiatry     Referral Priority:   Routine     Referral Type:   Eval and Treat     Referral Reason:   Specialty Services Required     Requested Specialty:   Psychiatry     Number of Visits Requested:   1     Orders Placed This Encounter   Medications    LORazepam (ATIVAN) 2 MG tablet     Sig: Take 1 tablet by mouth every 8 hours as needed for Anxiety for up to 30 days. Dispense:  90 tablet     Refill:  0     O[facility] report ran and is consistent with current and past history concerning scheduled medications.      Medications Discontinued During This Encounter   Medication Reason    traZODone (DESYREL) 50 MG tablet LIST CLEANUP    VRAYLAR 1.5 MG capsule Patient Choice    LORazepam (ATIVAN) 2 MG tablet Therapy completed     No follow-ups on file. Reviewed with the patient: current clinical status, medications, activities and diet. Side effects, adverse effects of the medication prescribed today, as well as treatment plan/ rationale and result expectations have been discussed with the patient who expresses understanding and desires to proceed. Close follow up to evaluate treatment results and for coordination of care. I have reviewed the patient's medical history in detail and updated the computerized patient record.     Norma Espinosa PA-C

## 2021-05-25 ENCOUNTER — TELEPHONE (OUTPATIENT)
Dept: FAMILY MEDICINE CLINIC | Age: 66
End: 2021-05-25

## 2021-05-25 NOTE — TELEPHONE ENCOUNTER
Patient stated pharmacy needed clarification on ativan order from yesterday. Spoke with pharmacy and they have everything needed. Patient was notified by pharmacy and myself that medication is ready for .

## 2021-06-15 ENCOUNTER — TELEPHONE (OUTPATIENT)
Dept: FAMILY MEDICINE CLINIC | Age: 66
End: 2021-06-15

## 2021-06-15 DIAGNOSIS — F43.0 PANIC STATE AS ACUTE REACTION TO STRESS: ICD-10-CM

## 2021-06-15 DIAGNOSIS — F41.9 ANXIETY: ICD-10-CM

## 2021-06-15 DIAGNOSIS — F43.21 GRIEF REACTION: ICD-10-CM

## 2021-06-15 RX ORDER — LORAZEPAM 2 MG/1
2 TABLET ORAL EVERY 8 HOURS PRN
Qty: 90 TABLET | Refills: 0 | Status: CANCELLED | OUTPATIENT
Start: 2021-06-15 | End: 2021-07-15

## 2021-06-15 NOTE — TELEPHONE ENCOUNTER
Pleaes provide patient with the behavioral health contact number. We have new provider starting later this month at [facility], he is a male, if she wants to wait for that referral.   How often is she taking the ativan?

## 2021-06-15 NOTE — TELEPHONE ENCOUNTER
Rx request   Requested Prescriptions     Pending Prescriptions Disp Refills    LORazepam (ATIVAN) 2 MG tablet 90 tablet 0     Sig: Take 1 tablet by mouth every 8 hours as needed for Anxiety for up to 30 days.      LOV 5/24/2021  Next Visit Date:  Future Appointments   Date Time Provider Joseph Roblero   8/17/2021  3:30 PM James Horn PA-C [facility]

## 2021-06-15 NOTE — TELEPHONE ENCOUNTER
Patient called in requesting she gets help finding a new counselor. She said she was seeing someone but they kept changing her from person to person. She is looking for someone steady to talk to. She is also requesting refills on her ativan.  I will pend that in a refill request.     Please advise and thank you

## 2021-06-15 NOTE — TELEPHONE ENCOUNTER
Patient needs a referral to behavioral health. Right now I am only scheduling patients with an outside source we are using called [facility] it is all VV.  But patient needs a referral so it can be in my workque then I can call and give patient the info

## 2021-06-17 NOTE — TELEPHONE ENCOUNTER
Patient has an apt Monday to discuss medications and psychiatrist appointments. She states her daughter is trying to help find her one. Thank you.

## 2021-06-17 NOTE — TELEPHONE ENCOUNTER
Pt states that you told her that last month that  will give her three months worth of ativan 2 mg until she found a new counseling place. Pt was really anxious and does not want the 1 mg tid. Please advise.

## 2021-06-21 ENCOUNTER — OFFICE VISIT (OUTPATIENT)
Dept: FAMILY MEDICINE CLINIC | Age: 66
End: 2021-06-21
Payer: MEDICARE

## 2021-06-21 VITALS
DIASTOLIC BLOOD PRESSURE: 98 MMHG | OXYGEN SATURATION: 98 % | HEIGHT: 71 IN | TEMPERATURE: 98.6 F | WEIGHT: 223.6 LBS | SYSTOLIC BLOOD PRESSURE: 138 MMHG | HEART RATE: 73 BPM | BODY MASS INDEX: 31.3 KG/M2 | RESPIRATION RATE: 16 BRPM

## 2021-06-21 DIAGNOSIS — F43.0 PANIC STATE AS ACUTE REACTION TO STRESS: ICD-10-CM

## 2021-06-21 DIAGNOSIS — N18.31 STAGE 3A CHRONIC KIDNEY DISEASE (HCC): ICD-10-CM

## 2021-06-21 DIAGNOSIS — R79.9 ABNORMAL FINDING OF BLOOD CHEMISTRY, UNSPECIFIED: ICD-10-CM

## 2021-06-21 DIAGNOSIS — F43.21 GRIEF REACTION: ICD-10-CM

## 2021-06-21 DIAGNOSIS — F41.9 ANXIETY: ICD-10-CM

## 2021-06-21 DIAGNOSIS — I10 BENIGN ESSENTIAL HTN: ICD-10-CM

## 2021-06-21 DIAGNOSIS — R03.0 ELEVATED BLOOD PRESSURE READING: ICD-10-CM

## 2021-06-21 DIAGNOSIS — N18.31 STAGE 3A CHRONIC KIDNEY DISEASE (HCC): Primary | ICD-10-CM

## 2021-06-21 LAB — HBA1C MFR BLD: 5.4 % (ref 4.8–5.9)

## 2021-06-21 PROCEDURE — 99214 OFFICE O/P EST MOD 30 MIN: CPT | Performed by: PHYSICIAN ASSISTANT

## 2021-06-21 RX ORDER — AMLODIPINE BESYLATE 5 MG/1
5 TABLET ORAL DAILY
Qty: 90 TABLET | Refills: 1 | Status: SHIPPED | OUTPATIENT
Start: 2021-06-21 | End: 2021-10-18 | Stop reason: SDUPTHER

## 2021-06-21 RX ORDER — LORAZEPAM 2 MG/1
2 TABLET ORAL EVERY 8 HOURS PRN
Qty: 90 TABLET | Refills: 0 | Status: SHIPPED | OUTPATIENT
Start: 2021-06-27 | End: 2021-07-19 | Stop reason: SDUPTHER

## 2021-06-21 RX ORDER — LISINOPRIL 20 MG/1
20 TABLET ORAL DAILY
Qty: 90 TABLET | Refills: 1 | Status: SHIPPED | OUTPATIENT
Start: 2021-06-21 | End: 2022-01-24 | Stop reason: SDUPTHER

## 2021-06-21 SDOH — ECONOMIC STABILITY: FOOD INSECURITY: WITHIN THE PAST 12 MONTHS, YOU WORRIED THAT YOUR FOOD WOULD RUN OUT BEFORE YOU GOT MONEY TO BUY MORE.: NEVER TRUE

## 2021-06-21 SDOH — ECONOMIC STABILITY: FOOD INSECURITY: WITHIN THE PAST 12 MONTHS, THE FOOD YOU BOUGHT JUST DIDN'T LAST AND YOU DIDN'T HAVE MONEY TO GET MORE.: NEVER TRUE

## 2021-06-21 ASSESSMENT — ENCOUNTER SYMPTOMS
DIARRHEA: 0
BACK PAIN: 1
BLOOD IN STOOL: 0
COUGH: 0
RECTAL PAIN: 0
COLOR CHANGE: 0
CHEST TIGHTNESS: 0
SINUS PRESSURE: 0
NAUSEA: 0
ABDOMINAL PAIN: 0
SINUS PAIN: 0
SHORTNESS OF BREATH: 0
ABDOMINAL DISTENTION: 0

## 2021-06-21 ASSESSMENT — SOCIAL DETERMINANTS OF HEALTH (SDOH): HOW HARD IS IT FOR YOU TO PAY FOR THE VERY BASICS LIKE FOOD, HOUSING, MEDICAL CARE, AND HEATING?: NOT HARD AT ALL

## 2021-06-21 NOTE — PROGRESS NOTES
Subjective  Harshad Juan Pablo, 63 y.o. female presents today with:  Chief Complaint   Patient presents with    Discuss Medications     pt would like to discuss medications states she is unable to see a psychiatrist at the moment no available appts for 2-3 months, pt states she needs medication refills until she is able to see a psychiatrist     Anxiety     ativan refill, stress and anxiety due to loss of family members      HPI  Durwood Phalen is in the office today for follow up visit. Last OV with me: 5/24/21    Anxiety, depression, panic disorder. Discussed at length at last OV regarding her chronic history of depression, anxiety and panic disorder. Was seeing therapist and psychiatrist at [facility]. Left facility as providers were not consistent, which led to worsening anxiety. Actively looking for new psychiatrist and therapist.  Braulio Rios is currently managed with ativan 2 mg, TID. Has tried to wean off of medication, unable to do so without side effects. Previously on daily medication for anxiety, per patient, she did not respond well. HTN/Elevated BP. Has been having some increased blood pressure readings. Admits to increased stressed, irritability, 'feeling flustered'. Increased tension-type HAs. Denies CP, QUINTERO, SOB. Compliant with current regimen. There is a history of CKD and HTN in her family. No tobacco use. Very occasional alcohol. Elevated uric acid. +joint pain. History of acute gout flares. Was not consistent with taking her allopurinol. Has since started taking daily. Most likely contributory is her CKD; not taking HCTZ. Denies excessive red meat intake.      Results for orders placed or performed in visit on 05/24/21   Uric Acid   Result Value Ref Range    Uric Acid, Serum 6.8 (H) 2.4 - 5.7 mg/dL   Basic Metabolic Panel   Result Value Ref Range    Sodium 136 135 - 144 mEq/L    Potassium 3.9 3.4 - 4.9 mEq/L    Chloride 103 95 - 107 mEq/L    CO2 27 20 - 31 mEq/L Tobacco Use    Smoking status: Never Smoker    Smokeless tobacco: Never Used   Substance and Sexual Activity    Alcohol use: No     Alcohol/week: 0.0 standard drinks    Drug use: No    Sexual activity: Yes     Comment: divorce   Other Topics Concern    Not on file   Social History Narrative    Not on file     Social Determinants of Health     Financial Resource Strain: Low Risk     Difficulty of Paying Living Expenses: Not hard at all   Food Insecurity: No Food Insecurity    Worried About Running Out of Food in the Last Year: Never true    [address] in the Last Year: Never true   Transportation Needs:     Lack of Transportation (Medical):  Lack of Transportation (Non-Medical):    Physical Activity:     Days of Exercise per Week:     Minutes of Exercise per Session:    Stress:     Feeling of Stress :    Social Connections:     Frequency of Communication with Friends and Family:     Frequency of Social Gatherings with Friends and Family:     Attends Yarsani Services:     Active Member of Clubs or Organizations:     Attends Club or Organization Meetings:     Marital Status:    Intimate Partner Violence:     Fear of Current or Ex-Partner:     Emotionally Abused:     Physically Abused:     Sexually Abused:      Family History   Problem Relation Age of Onset    Cancer Mother         esophageal    Seizures Father     Seizures Brother         back problems    Cancer Maternal Aunt         esophageal    Cancer Maternal Uncle         lung     Allergies   Allergen Reactions    Ibuprofen Other (See Comments)    Midazolam Hcl      Other reaction(s):  Other: See Comments  Makes her feel more anxious    Nsaids Other (See Comments)    Sulfa Antibiotics Other (See Comments)     elevated blood pressure    Sulfasalazine Other (See Comments)     elevated blood pressure     Current Outpatient Medications   Medication Sig Dispense Refill    [START ON 6/27/2021] LORazepam (ATIVAN) 2 MG tablet Take 1 tablet by mouth every 8 hours as needed for Anxiety for up to 30 days. 90 tablet 0    lisinopril (PRINIVIL;ZESTRIL) 20 MG tablet Take 1 tablet by mouth daily 90 tablet 1    amLODIPine (NORVASC) 5 MG tablet Take 1 tablet by mouth daily 90 tablet 1    vitamin D (ERGOCALCIFEROL) 1.25 MG (56384 UT) CAPS capsule TAKE 1 CAPSULE BY MOUTH 1 TIME A WEEK 12 capsule 4    pregabalin (LYRICA) 100 MG capsule Take 100 mg by mouth 2 times daily.  allopurinol (ZYLOPRIM) 100 MG tablet Take 1 tablet by mouth daily 90 tablet 1    metoprolol succinate (TOPROL XL) 100 MG extended release tablet TAKE 1 TABLET BY MOUTH DAILY 90 tablet 2    fexofenadine (ALLEGRA) 180 MG tablet Take 1 tablet by mouth daily 90 tablet 1    fluticasone (FLONASE) 50 MCG/ACT nasal spray SHAKE LIQUID AND USE 1 SPRAY IN EACH NOSTRIL DAILY 48 g 3    traMADol (ULTRAM) 50 MG tablet 1-2 tablet by mouth 3 times a day  for pain prn 120 tablet 0     No current facility-administered medications for this visit. PMH, Surgical Hx, Family Hx, and Social Hx reviewed and updated. Health Maintenance reviewed. Objective  Vitals:    06/21/21 0801 06/21/21 0837   BP: (!) 140/100 (!) 138/98   Site: Left Lower Arm Right Upper Arm   Position: Sitting Sitting   Cuff Size: Medium Adult Large Adult   Pulse: 73    Resp: 16    Temp: 98.6 °F (37 °C)    TempSrc: Temporal    SpO2: 98%    Weight: 223 lb 9.6 oz (101.4 kg)    Height: 5' 11\" (1.803 m)      BP Readings from Last 3 Encounters:   06/21/21 (!) 138/98   05/24/21 138/88   03/03/21 128/79     Wt Readings from Last 3 Encounters:   06/21/21 223 lb 9.6 oz (101.4 kg)   05/24/21 228 lb (103.4 kg)   03/03/21 220 lb (99.8 kg)     Physical Exam  Vitals reviewed. Constitutional:       Appearance: She is obese. HENT:      Head: Normocephalic and atraumatic. Right Ear: External ear normal.      Left Ear: External ear normal.   Cardiovascular:      Rate and Rhythm: Normal rate and regular rhythm.    Pulmonary: 1 week for MA, BP check. Return to office to see me in August, as scheduled. Orders Placed This Encounter   Procedures    Hemoglobin A1C     Standing Status:   Future     Standing Expiration Date:   6/21/2022     Orders Placed This Encounter   Medications    LORazepam (ATIVAN) 2 MG tablet     Sig: Take 1 tablet by mouth every 8 hours as needed for Anxiety for up to 30 days. Dispense:  90 tablet     Refill:  0     OARRS report ran and is consistent with current and past history concerning scheduled medications.  lisinopril (PRINIVIL;ZESTRIL) 20 MG tablet     Sig: Take 1 tablet by mouth daily     Dispense:  90 tablet     Refill:  1    amLODIPine (NORVASC) 5 MG tablet     Sig: Take 1 tablet by mouth daily     Dispense:  90 tablet     Refill:  1     Medications Discontinued During This Encounter   Medication Reason    lisinopril (PRINIVIL;ZESTRIL) 20 MG tablet REORDER    LORazepam (ATIVAN) 2 MG tablet REORDER     No follow-ups on file. Reviewed with the patient: current clinical status, medications, activities and diet. Side effects, adverse effects of the medication prescribed today, as well as treatment plan/ rationale and result expectations have been discussed with the patient who expresses understanding and desires to proceed. Close follow up to evaluate treatment results and for coordination of care. I have reviewed the patient's medical history in detail and updated the computerized patient record.     Norma Espinosa PA-C

## 2021-07-08 ENCOUNTER — TELEPHONE (OUTPATIENT)
Dept: FAMILY MEDICINE CLINIC | Age: 66
End: 2021-07-08

## 2021-07-08 NOTE — TELEPHONE ENCOUNTER
Patient is calling to let pcp know that she has been checking her BP with an at home cuff. She states it has been average. Yesterdays reading was 120/70. She is still planning on coming into the office to have it checked. She also wanted to make you aware that she is still looking for a counselor. She is having a difficult time due to insurance. Thank you.

## 2021-07-19 DIAGNOSIS — F43.0 PANIC STATE AS ACUTE REACTION TO STRESS: ICD-10-CM

## 2021-07-19 DIAGNOSIS — F41.9 ANXIETY: ICD-10-CM

## 2021-07-19 DIAGNOSIS — F43.21 GRIEF REACTION: ICD-10-CM

## 2021-07-19 RX ORDER — LORAZEPAM 2 MG/1
2 TABLET ORAL EVERY 8 HOURS PRN
Qty: 90 TABLET | Refills: 0 | Status: SHIPPED | OUTPATIENT
Start: 2021-07-19 | End: 2021-08-17 | Stop reason: SDUPTHER

## 2021-08-17 ENCOUNTER — OFFICE VISIT (OUTPATIENT)
Dept: FAMILY MEDICINE CLINIC | Age: 66
End: 2021-08-17
Payer: MEDICARE

## 2021-08-17 VITALS
OXYGEN SATURATION: 97 % | BODY MASS INDEX: 31.27 KG/M2 | SYSTOLIC BLOOD PRESSURE: 130 MMHG | HEIGHT: 71 IN | WEIGHT: 223.4 LBS | HEART RATE: 68 BPM | TEMPERATURE: 97.6 F | RESPIRATION RATE: 16 BRPM | DIASTOLIC BLOOD PRESSURE: 90 MMHG

## 2021-08-17 DIAGNOSIS — H40.89 OTHER GLAUCOMA OF BOTH EYES: ICD-10-CM

## 2021-08-17 DIAGNOSIS — Z00.00 ROUTINE GENERAL MEDICAL EXAMINATION AT A HEALTH CARE FACILITY: Primary | ICD-10-CM

## 2021-08-17 DIAGNOSIS — J30.2 SEASONAL ALLERGIES: ICD-10-CM

## 2021-08-17 DIAGNOSIS — N18.31 STAGE 3A CHRONIC KIDNEY DISEASE (HCC): Primary | ICD-10-CM

## 2021-08-17 DIAGNOSIS — I10 BENIGN ESSENTIAL HTN: ICD-10-CM

## 2021-08-17 DIAGNOSIS — F43.0 PANIC STATE AS ACUTE REACTION TO STRESS: ICD-10-CM

## 2021-08-17 DIAGNOSIS — F41.9 ANXIETY: ICD-10-CM

## 2021-08-17 DIAGNOSIS — F43.21 GRIEF REACTION: ICD-10-CM

## 2021-08-17 PROCEDURE — 99214 OFFICE O/P EST MOD 30 MIN: CPT | Performed by: PHYSICIAN ASSISTANT

## 2021-08-17 PROCEDURE — G0438 PPPS, INITIAL VISIT: HCPCS | Performed by: PHYSICIAN ASSISTANT

## 2021-08-17 RX ORDER — LATANOPROST 50 UG/ML
SOLUTION/ DROPS OPHTHALMIC
COMMUNITY
Start: 2021-08-02

## 2021-08-17 RX ORDER — CARIPRAZINE 1.5 MG/1
CAPSULE, GELATIN COATED ORAL
COMMUNITY
Start: 2021-07-26 | End: 2022-02-15 | Stop reason: SINTOL

## 2021-08-17 RX ORDER — LORAZEPAM 2 MG/1
2 TABLET ORAL EVERY 8 HOURS PRN
Qty: 90 TABLET | Refills: 0 | Status: SHIPPED | OUTPATIENT
Start: 2021-08-21 | End: 2021-09-07 | Stop reason: SDUPTHER

## 2021-08-17 RX ORDER — FLUTICASONE PROPIONATE 50 MCG
SPRAY, SUSPENSION (ML) NASAL
Qty: 48 G | Refills: 3 | Status: SHIPPED | OUTPATIENT
Start: 2021-08-17

## 2021-08-17 RX ORDER — FEXOFENADINE HCL 180 MG/1
180 TABLET ORAL DAILY
Qty: 90 TABLET | Refills: 1 | Status: SHIPPED | OUTPATIENT
Start: 2021-08-17

## 2021-08-17 ASSESSMENT — PATIENT HEALTH QUESTIONNAIRE - PHQ9
SUM OF ALL RESPONSES TO PHQ QUESTIONS 1-9: 0
1. LITTLE INTEREST OR PLEASURE IN DOING THINGS: 0
2. FEELING DOWN, DEPRESSED OR HOPELESS: 0
SUM OF ALL RESPONSES TO PHQ9 QUESTIONS 1 & 2: 0

## 2021-08-17 ASSESSMENT — LIFESTYLE VARIABLES: HOW OFTEN DO YOU HAVE A DRINK CONTAINING ALCOHOL: 0

## 2021-08-17 ASSESSMENT — ENCOUNTER SYMPTOMS
COUGH: 0
CHEST TIGHTNESS: 0
NAUSEA: 0
COLOR CHANGE: 0
DIARRHEA: 0
BACK PAIN: 1
SINUS PAIN: 0
RECTAL PAIN: 0
BLOOD IN STOOL: 0
ABDOMINAL PAIN: 0
SINUS PRESSURE: 0
SHORTNESS OF BREATH: 0
ABDOMINAL DISTENTION: 0

## 2021-08-17 NOTE — PROGRESS NOTES
Medicare Annual Wellness Visit  Name: Grisel Tovar Date: 2021   MRN: [id number] Sex: Female   Age: 61 y.o. Ethnicity: Non- / Non    : 1955 Race: Black / Dominic Elinor is here for Medicare AWV    Screenings for behavioral, psychosocial and functional/safety risks, and cognitive dysfunction are all negative except as indicated below. These results, as well as other patient data from the [address] form, are documented in Flowsheets linked to this Encounter. Allergies   Allergen Reactions    Ibuprofen Other (See Comments)    Midazolam Hcl      Other reaction(s): Other: See Comments  Makes her feel more anxious    Nsaids Other (See Comments)    Sulfa Antibiotics Other (See Comments)     elevated blood pressure    Sulfasalazine Other (See Comments)     elevated blood pressure       Prior to Visit Medications    Medication Sig Taking? Authorizing Provider   latanoprost (XALATAN) 0.005 % ophthalmic solution INSTILL 1 DROP IN BOTH EYES EVERY EVENING Yes Historical Provider, MD   VRAYLAR 1.5 MG capsule TAKE 1 CAPSULE BY MOUTH EVERY DAY Yes Historical Provider, MD   allopurinol (ZYLOPRIM) 100 MG tablet TAKE 1 TABLET BY MOUTH DAILY Yes Norma Espinosa PA-C   LORazepam (ATIVAN) 2 MG tablet Take 1 tablet by mouth every 8 hours as needed for Anxiety for up to 30 days. Yes Norma Espinosa PA-C   lisinopril (PRINIVIL;ZESTRIL) 20 MG tablet Take 1 tablet by mouth daily Yes Norma Espinosa PA-C   amLODIPine (NORVASC) 5 MG tablet Take 1 tablet by mouth daily Yes Norma Espinosa PA-C   vitamin D (ERGOCALCIFEROL) 1.25 MG (92690 UT) CAPS capsule TAKE 1 CAPSULE BY MOUTH 1 TIME A WEEK Yes Norma Espinosa PA-C   pregabalin (LYRICA) 100 MG capsule Take 100 mg by mouth 2 times daily.  Yes Historical Provider, MD   metoprolol succinate (TOPROL XL) 100 MG extended release tablet TAKE 1 TABLET BY MOUTH DAILY Yes Norma Espinosa PA-C   fexofenadine (ALLEGRA) 180 MG tablet Take 1 tablet by mouth daily Yes Norma Espinosa PA-C   fluticasone (FLONASE) 50 MCG/ACT nasal spray SHAKE LIQUID AND USE 1 SPRAY IN EACH NOSTRIL DAILY Yes Norma Espinosa PA-C   traMADol (ULTRAM) 50 MG tablet 1-2 tablet by mouth 3 times a day  for pain prn Yes Elina Hill MD       Past Medical History:   Diagnosis Date    Anxiety     Chronic back pain     Hypertension     Obesity     Pain     ankle and leg       Past Surgical History:   Procedure Laterality Date    BREAST SURGERY      CYST REMOVAL      FRACTURE SURGERY      HYSTERECTOMY         Family History   Problem Relation Age of Onset    Cancer Mother         esophageal    Seizures Father     Seizures Brother         back problems    Cancer Maternal Aunt         esophageal    Cancer Maternal Uncle         lung       CareTeam (Including outside providers/suppliers regularly involved in providing care):   Patient Care Team:  Bryon Gonzales PA-C as PCP - General (Family Medicine)  Bryon Gonzales PA-C as PCP - [facility] Empaneled Provider    Wt Readings from Last 3 Encounters:   06/21/21 223 lb 9.6 oz (101.4 kg)   05/24/21 228 lb (103.4 kg)   03/03/21 220 lb (99.8 kg)     There were no vitals filed for this visit. There is no height or weight on file to calculate BMI. Based upon direct observation of the patient, evaluation of cognition reveals recent and remote memory intact. Patient's complete Health Risk Assessment and screening values have been reviewed and are found in Flowsheets. The following problems were reviewed today and where indicated follow up appointments were made and/or referrals ordered. Positive Risk Factor Screenings with Interventions:          General Health and ACP:  General  In general, how would you say your health is?: Fair  In the past 7 days, have you experienced any of the following?  New or Increased Pain, New or Increased Fatigue, Loneliness, Social Isolation, Stress or Anger?: (!) New or Increased Pain, Stress  Do you get the social and emotional support that you need?: (!) No  Do you have a Living Will?: (!) No  Advance Directives     Power of  Living Will ACP-Advance Directive ACP-Power of     Not on File Not on File Not on File Not on File      General Health Risk Interventions:  · Pain issues: patient declines any further evaluation/treatment for this issue, seen by pain mangement at [facility]  · Stress: patient declines any further evaluation/treatment for this issue  · No Living Will: Patient declines ACP discussion/assistance    Health Habits/Nutrition:  Health Habits/Nutrition  Do you exercise for at least 20 minutes 2-3 times per week?: Yes  Have you lost any weight without trying in the past 3 months?: No  Do you eat only one meal per day?: No  Have you seen the dentist within the past year?: Yes     Health Habits/Nutrition Interventions:  · no concerns idenitified     Hearing/Vision:  No exam data present  Hearing/Vision  Do you or your family notice any trouble with your hearing that hasn't been managed with hearing aids?: (!) Yes  Do you have difficulty driving, watching TV, or doing any of your daily activities because of your eyesight?: (!) Yes (Glaucoma)  Have you had an eye exam within the past year?: Yes  Hearing/Vision Interventions:  · Vision concerns:  follow[facility] by [facility]       Personalized Preventive Plan   Current Health Maintenance Status  Immunization History   Administered Date(s) Administered    COVID-19, Steven Peter, PF, 30mcg/0.3mL 03/25/2021, 04/15/2021        Health Maintenance   Topic Date Due    Annual Wellness Visit (AWV)  08/04/2021    DTaP/Tdap/Td vaccine (1 - Tdap) 06/21/2022 (Originally 8/14/1974)    Cervical cancer screen  06/21/2022 (Originally 4/27/2013)    Shingles Vaccine (1 of 2) 06/21/2022 (Originally 8/14/2007)    Flu vaccine (1) 09/01/2021    Potassium monitoring  05/24/2022    Creatinine monitoring  05/24/2022    Breast cancer screen  02/22/2023    Lipid screen  02/22/2026    Colon cancer screen colonoscopy  05/09/2026    COVID-19 Vaccine  Completed    Hepatitis C screen  Completed    HIV screen  Completed    Hepatitis A vaccine  Aged Out    Hepatitis B vaccine  Aged Out    Hib vaccine  Aged Out    Meningococcal (ACWY) vaccine  Aged Out    Pneumococcal 0-64 years Vaccine  Aged Out     Recommendations for [employer] Due: see orders and patient instructions/AVS.  . Recommended screening schedule for the next 5-10 years is provided to the patient in written form: see Patient Instructions/AVS.    Rolanda Guillen was seen today for medicare awv. Diagnoses and all orders for this visit:    Routine general medical examination at a health care facility                 [facility] WALDEMAR Espinosa PA-C

## 2021-09-07 DIAGNOSIS — F43.21 GRIEF REACTION: ICD-10-CM

## 2021-09-07 DIAGNOSIS — F41.9 ANXIETY: ICD-10-CM

## 2021-09-07 DIAGNOSIS — F43.0 PANIC STATE AS ACUTE REACTION TO STRESS: ICD-10-CM

## 2021-09-07 RX ORDER — LORAZEPAM 2 MG/1
2 TABLET ORAL EVERY 8 HOURS PRN
Qty: 90 TABLET | Refills: 0 | Status: SHIPPED | OUTPATIENT
Start: 2021-09-07 | End: 2021-10-11 | Stop reason: SDUPTHER

## 2021-09-07 NOTE — TELEPHONE ENCOUNTER
Please let patient know that this is the last month for refill. She needs to see psychiatry for this at this point on.

## 2021-09-07 NOTE — TELEPHONE ENCOUNTER
Patient requesting medication refill. Please approve or deny this request.    Rx requested:  Requested Prescriptions     Pending Prescriptions Disp Refills    LORazepam (ATIVAN) 2 MG tablet 90 tablet 0     Sig: Take 1 tablet by mouth every 8 hours as needed for Anxiety for up to 30 days.          Last Office Visit:   8/17/2021      Next Visit Date:  Future Appointments   Date Time Provider Joseph Roblero   1/21/2022 11:15 AM Shari Davis PA-C [address] [place] [address]

## 2021-09-28 LAB — BCS RECOMMENDATION EXT: NORMAL

## 2021-10-11 ENCOUNTER — TELEPHONE (OUTPATIENT)
Dept: FAMILY MEDICINE CLINIC | Age: 66
End: 2021-10-11

## 2021-10-11 DIAGNOSIS — F43.0 PANIC STATE AS ACUTE REACTION TO STRESS: ICD-10-CM

## 2021-10-11 DIAGNOSIS — N18.31 STAGE 3A CHRONIC KIDNEY DISEASE (HCC): ICD-10-CM

## 2021-10-11 DIAGNOSIS — F43.21 GRIEF REACTION: ICD-10-CM

## 2021-10-11 DIAGNOSIS — F41.9 ANXIETY: ICD-10-CM

## 2021-10-11 DIAGNOSIS — I10 BENIGN ESSENTIAL HTN: Primary | ICD-10-CM

## 2021-10-11 RX ORDER — LORAZEPAM 2 MG/1
2 TABLET ORAL EVERY 8 HOURS PRN
Qty: 90 TABLET | Refills: 0 | Status: SHIPPED | OUTPATIENT
Start: 2021-10-11 | End: 2021-11-13 | Stop reason: SDUPTHER

## 2021-10-11 NOTE — TELEPHONE ENCOUNTER
Rx request   Requested Prescriptions     Pending Prescriptions Disp Refills    LORazepam (ATIVAN) 2 MG tablet 90 tablet 0     Sig: Take 1 tablet by mouth every 8 hours as needed for Anxiety for up to 30 days.      LOV 8/17/2021  Next Visit Date:  Future Appointments   Date Time Provider Joseph Roblero   1/21/2022 11:15 AM Polina Mendes PA-C [place]

## 2021-10-11 NOTE — TELEPHONE ENCOUNTER
Patient called in and is asking for a short supply of amlodipine and metoprolol. She is out of town and forgot her meds. Please advise and thank you     Patient is using [facility] on [address] in [place].

## 2021-10-12 RX ORDER — AMLODIPINE BESYLATE 5 MG/1
5 TABLET ORAL DAILY
Qty: 7 TABLET | Refills: 0 | Status: SHIPPED | OUTPATIENT
Start: 2021-10-12 | End: 2021-12-28 | Stop reason: SDUPTHER

## 2021-10-12 RX ORDER — METOPROLOL SUCCINATE 100 MG/1
100 TABLET, EXTENDED RELEASE ORAL DAILY
Qty: 7 TABLET | Refills: 0 | Status: SHIPPED | OUTPATIENT
Start: 2021-10-12 | End: 2021-10-18 | Stop reason: SDUPTHER

## 2021-10-17 DIAGNOSIS — I10 BENIGN ESSENTIAL HTN: ICD-10-CM

## 2021-10-17 DIAGNOSIS — Z76.0 MEDICATION REFILL: ICD-10-CM

## 2021-10-17 NOTE — TELEPHONE ENCOUNTER
Rx request   Requested Prescriptions     Pending Prescriptions Disp Refills    metoprolol succinate (TOPROL XL) 100 MG extended release tablet [Pharmacy Med Name: METOPROLOL ER SUCCINATE 100MG TABS] 90 tablet 2     Sig: TAKE 1 TABLET BY MOUTH DAILY     LOV 8/17/2021  Next Visit Date:  Future Appointments   Date Time Provider Joseph Roblero   1/21/2022 11:15 AM Jie Amezquita PA-C [facility]

## 2021-10-18 RX ORDER — METOPROLOL SUCCINATE 100 MG/1
100 TABLET, EXTENDED RELEASE ORAL DAILY
Qty: 90 TABLET | Refills: 2 | Status: SHIPPED | OUTPATIENT
Start: 2021-10-18 | End: 2022-03-24

## 2021-11-10 DIAGNOSIS — F43.0 PANIC STATE AS ACUTE REACTION TO STRESS: ICD-10-CM

## 2021-11-10 DIAGNOSIS — F43.21 GRIEF REACTION: ICD-10-CM

## 2021-11-10 DIAGNOSIS — F41.9 ANXIETY: ICD-10-CM

## 2021-11-10 NOTE — TELEPHONE ENCOUNTER
Rx request   Requested Prescriptions     Pending Prescriptions Disp Refills    LORazepam (ATIVAN) 2 MG tablet 90 tablet 0     Sig: Take 1 tablet by mouth every 8 hours as needed for Anxiety for up to 30 days.      LOV 8/17/2021  Next Visit Date:  Future Appointments   Date Time Provider Joseph Roblero   1/21/2022 11:15 AM MADELAINE Thompson [facility]

## 2021-11-11 RX ORDER — LORAZEPAM 2 MG/1
2 TABLET ORAL EVERY 8 HOURS PRN
Qty: 90 TABLET | Refills: 0 | OUTPATIENT
Start: 2021-11-11 | End: 2021-12-11

## 2021-11-13 DIAGNOSIS — F43.0 PANIC STATE AS ACUTE REACTION TO STRESS: ICD-10-CM

## 2021-11-13 DIAGNOSIS — F41.9 ANXIETY: ICD-10-CM

## 2021-11-13 DIAGNOSIS — F43.21 GRIEF REACTION: ICD-10-CM

## 2021-11-13 RX ORDER — LORAZEPAM 2 MG/1
2 TABLET ORAL EVERY 8 HOURS PRN
Qty: 21 TABLET | Refills: 0 | Status: SHIPPED | OUTPATIENT
Start: 2021-11-13 | End: 2021-11-20

## 2021-11-15 DIAGNOSIS — F41.9 ANXIETY: ICD-10-CM

## 2021-11-15 DIAGNOSIS — F43.21 GRIEF REACTION: ICD-10-CM

## 2021-11-15 DIAGNOSIS — F43.0 PANIC STATE AS ACUTE REACTION TO STRESS: ICD-10-CM

## 2021-11-15 NOTE — TELEPHONE ENCOUNTER
Rx request   Requested Prescriptions     Pending Prescriptions Disp Refills    LORazepam (ATIVAN) 2 MG tablet 21 tablet 0     Sig: Take 1 tablet by mouth every 8 hours as needed for Anxiety for up to 7 days.      LOV 8/17/2021  Next Visit Date:  Future Appointments   Date Time Provider Jospeh Roblero   1/21/2022 11:15 AM Sabina Yarbrough PA-C [facility]

## 2021-11-16 ENCOUNTER — TELEPHONE (OUTPATIENT)
Dept: FAMILY MEDICINE CLINIC | Age: 66
End: 2021-11-16

## 2021-11-16 RX ORDER — LORAZEPAM 2 MG/1
2 TABLET ORAL EVERY 8 HOURS PRN
Qty: 21 TABLET | Refills: 0 | OUTPATIENT
Start: 2021-11-16 | End: 2021-11-23

## 2021-11-16 NOTE — TELEPHONE ENCOUNTER
Yes, but patient states she only received 7 pills and pharmacy is not giving her anymore unless she has a new Rx.

## 2021-11-16 NOTE — TELEPHONE ENCOUNTER
Tomas Pelletier spoke with Star Tran and she states pt needs to be seen with Harvinder Schultz and she needs to follow up with a psychiatrist because she will not be refilling this medication anymore

## 2021-11-16 NOTE — TELEPHONE ENCOUNTER
Patient called a few times for a refill for her Ativan. She was given a 7 day supply for [address]. I spoke with [facility] and Zully states she is not refilling this medication because prior conversations pt was supposed to establish care with a psychiatrist to continue this med. Patient was aware of this information so she maria luz. With Lisa Culver on 11/16 to get a referral to psych. She called shortly before her appt. And states her allergies were acting up and she could not make the appt. Per [facility] she will not be refilling this medication.  Patient needs to est. Care with psych or got to ER

## 2021-11-17 ENCOUNTER — TELEPHONE (OUTPATIENT)
Dept: FAMILY MEDICINE CLINIC | Age: 66
End: 2021-11-17

## 2021-11-17 NOTE — TELEPHONE ENCOUNTER
Patient called in requesting a referral to [facility] behavior unit for a psychiatrist.    Please advise and thank you

## 2021-11-22 DIAGNOSIS — F43.0 PANIC STATE AS ACUTE REACTION TO STRESS: ICD-10-CM

## 2021-11-22 DIAGNOSIS — F41.9 ANXIETY: ICD-10-CM

## 2021-11-22 DIAGNOSIS — F43.21 GRIEF REACTION: ICD-10-CM

## 2021-11-22 NOTE — TELEPHONE ENCOUNTER
Patient calling in regards to Ativan prescription she states she needs refill on ativan she has an appointment with [facility] psychology coming up (no date) but is in fear of running out of medication due to increased anxiety she states Jessi Marshall told her she can not stop medication cold turkey and would like help she is trying to avoid the ER     Per previous phone messages by Mike Gaines will not fill due to needing to see psych     Please advise

## 2021-11-23 RX ORDER — LORAZEPAM 2 MG/1
2 TABLET ORAL EVERY 8 HOURS PRN
Qty: 21 TABLET | Refills: 0 | OUTPATIENT
Start: 2021-11-23 | End: 2021-11-30

## 2021-11-24 NOTE — TELEPHONE ENCOUNTER
I will not refill based on previous conversations with patient and the need for ED if withdrawal.  Patient is well aware of the need to have had found and established with psychiatry.

## 2021-11-26 ENCOUNTER — HOSPITAL ENCOUNTER (EMERGENCY)
Age: 66
Discharge: HOME | End: 2021-11-26
Attending: EMERGENCY MEDICINE
Payer: MEDICARE

## 2021-11-26 VITALS
RESPIRATION RATE: 20 BRPM | SYSTOLIC BLOOD PRESSURE: 161 MMHG | HEART RATE: 86 BPM | TEMPERATURE: 96.4 F | OXYGEN SATURATION: 99 % | DIASTOLIC BLOOD PRESSURE: 92 MMHG | BODY MASS INDEX: 32.08 KG/M2 | WEIGHT: 230 LBS

## 2021-11-26 DIAGNOSIS — F41.1 ANXIETY STATE: Primary | ICD-10-CM

## 2021-11-26 DIAGNOSIS — Z76.0 ENCOUNTER FOR MEDICATION REFILL: ICD-10-CM

## 2021-11-26 PROCEDURE — 99283 EMERGENCY DEPT VISIT LOW MDM: CPT

## 2021-11-26 RX ORDER — LORAZEPAM 2 MG/1
2 TABLET ORAL EVERY 8 HOURS PRN
Qty: 21 TABLET | Refills: 0 | Status: SHIPPED | OUTPATIENT
Start: 2021-11-26 | End: 2021-12-02

## 2021-11-26 ASSESSMENT — ENCOUNTER SYMPTOMS
NAUSEA: 0
SHORTNESS OF BREATH: 0
DIARRHEA: 0
CHEST TIGHTNESS: 0
BACK PAIN: 0
SORE THROAT: 0
EYE PAIN: 0

## 2021-11-26 NOTE — ED TRIAGE NOTES
Pt to ED with c/o anxiety/panic attack. Pt anxious, cooperative. States she has been on ativan 2mg PO for years. Her PCP recently went on maternity leave and the associate covering was only able to give her 8 pills. Pt has been out of ativan for 2 days.

## 2021-11-26 NOTE — ED PROVIDER NOTES
by mouth daily    FLUTICASONE (FLONASE) 50 MCG/ACT NASAL SPRAY    SHAKE LIQUID AND USE 1 SPRAY IN EACH NOSTRIL DAILY    LATANOPROST (XALATAN) 0.005 % OPHTHALMIC SOLUTION    INSTILL 1 DROP IN BOTH EYES EVERY EVENING    LISINOPRIL (PRINIVIL;ZESTRIL) 20 MG TABLET    Take 1 tablet by mouth daily    METOPROLOL SUCCINATE (TOPROL XL) 100 MG EXTENDED RELEASE TABLET    TAKE 1 TABLET BY MOUTH DAILY    PREGABALIN (LYRICA) 100 MG CAPSULE    Take 100 mg by mouth 2 times daily.     TRAMADOL (ULTRAM) 50 MG TABLET    1-2 tablet by mouth 3 times a day  for pain prn    VITAMIN D (ERGOCALCIFEROL) 1.25 MG (57338 UT) CAPS CAPSULE    TAKE 1 CAPSULE BY MOUTH 1 TIME A WEEK    VRAYLAR 1.5 MG CAPSULE    TAKE 1 CAPSULE BY MOUTH EVERY DAY       ALLERGIES     Ibuprofen, Midazolam hcl, Nsaids, Sulfa antibiotics, and Sulfasalazine    FAMILY HISTORY       Family History   Problem Relation Age of Onset    Cancer Mother         esophageal    Seizures Father     Seizures Brother         back problems    Cancer Maternal Aunt         esophageal    Cancer Maternal Uncle         lung          SOCIAL HISTORY       Social History     Socioeconomic History    Marital status:      Spouse name: None    Number of children: None    Years of education: None    Highest education level: None   Occupational History    None   Tobacco Use    Smoking status: Never Smoker    Smokeless tobacco: Never Used   Substance and Sexual Activity    Alcohol use: No     Alcohol/week: 0.0 standard drinks    Drug use: No    Sexual activity: Yes     Comment: divorce   Other Topics Concern    None   Social History Narrative    None     Social Determinants of Health     Financial Resource Strain: Low Risk     Difficulty of Paying Living Expenses: Not hard at all   Food Insecurity: No Food Insecurity    Worried About Running Out of Food in the Last Year: Never true    Morgan of Food in the Last Year: Never true   Transportation Needs:     Lack of Transportation (Medical): Not on file    Lack of Transportation (Non-Medical): Not on file   Physical Activity:     Days of Exercise per Week: Not on file    Minutes of Exercise per Session: Not on file   Stress:     Feeling of Stress : Not on file   Social Connections:     Frequency of Communication with Friends and Family: Not on file    Frequency of Social Gatherings with Friends and Family: Not on file    Attends Druze Services: Not on file    Active Member of [address] [place] [address] [employer] or Organizations: Not on file    Attends Club or Organization Meetings: Not on file    Marital Status: Not on file   Intimate Partner Violence:     Fear of Current or Ex-Partner: Not on file    Emotionally Abused: Not on file    Physically Abused: Not on file    Sexually Abused: Not on file   Housing Stability:     Unable to Pay for Housing in the Last Year: Not on file    Number of [place] in the Last Year: Not on file    Unstable Housing in the Last Year: Not on file         PHYSICAL EXAM       ED Triage Vitals [11/26/21 0913]   BP Temp Temp Source Pulse Resp SpO2 Height Weight   (!) 161/92 96.4 °F (35.8 °C) Temporal 86 20 99 % -- 230 lb (104.3 kg)       Physical Exam  Constitutional:       Appearance: Normal appearance. Comments: Some sweating in the forehead. HENT:      Head: Normocephalic and atraumatic. Right Ear: External ear normal.      Left Ear: External ear normal.      Nose: Nose normal.      Mouth/Throat:      Mouth: Mucous membranes are moist.   Eyes:      Extraocular Movements: Extraocular movements intact. Conjunctiva/sclera: Conjunctivae normal.   Cardiovascular:      Rate and Rhythm: Normal rate and regular rhythm. Pulses: Normal pulses. Heart sounds: Normal heart sounds. No murmur heard. Pulmonary:      Effort: Pulmonary effort is normal.      Breath sounds: Normal breath sounds. No stridor. No wheezing or rhonchi. Abdominal:      Palpations: Abdomen is soft. Tenderness: There is no abdominal tenderness. Musculoskeletal:         General: Normal range of motion. Cervical back: Normal range of motion and neck supple. No tenderness. Skin:     General: Skin is warm and dry. Neurological:      General: No focal deficit present. Mental Status: She is alert and oriented to person, place, and time. Psychiatric:         Mood and Affect: Mood normal.         Behavior: Behavior normal.           MDM  This is a 61year old female presenting for medication refill. Pt has benzodiazepine dependence and recent loss of provider to fill prescriptions. Patient has some increased sweating and anxiety, but HR is normal, patient is hemodynamically stable, no tremors, is not have delirious symptoms. Patient agreeable to discharge with shortterm medication refill and referral to psych. Patient agrees to call and schedule urgent appointment. She will return if symptoms change or worsen. FINAL IMPRESSION      1. Anxiety state    2.  Encounter for medication refill          DISPOSITION/PLAN   DISPOSITION Decision To Discharge 11/26/2021 09:33:47 AM        DISCHARGE MEDICATIONS:  [unfilled]         Lucia Rosenthal PA-C(electronically signed)  Attending Emergency Physician          Lucia Rosenthal PA-C  11/26/21 5921

## 2021-11-29 ENCOUNTER — TELEPHONE (OUTPATIENT)
Dept: FAMILY MEDICINE CLINIC | Age: 66
End: 2021-11-29

## 2021-11-29 NOTE — TELEPHONE ENCOUNTER
ADRYAN: Jenn Sylvester calling because she was seen at the ER on 11/26/21 due to anxiety because she has been off of her Ativan. Patient is aware of previous messages from pcp as well as the need to have a psychiatry apt. She states she has an assessment on 12/7 and an [facility] Thank you.

## 2021-11-30 NOTE — TELEPHONE ENCOUNTER
Patient is checking on her medication request for Ativan 2mg she only has enough to last her till tomorrow and the ER  told her that she should never go with out. Patient needs the medication to go to [facility] on [address] [place] [phone].  please advise

## 2021-12-02 ENCOUNTER — HOSPITAL ENCOUNTER (EMERGENCY)
Age: 66
Discharge: HOME | End: 2021-12-02
Attending: EMERGENCY MEDICINE
Payer: MEDICARE

## 2021-12-02 VITALS
DIASTOLIC BLOOD PRESSURE: 100 MMHG | SYSTOLIC BLOOD PRESSURE: 163 MMHG | TEMPERATURE: 98.3 F | HEART RATE: 101 BPM | OXYGEN SATURATION: 99 % | WEIGHT: 230 LBS | RESPIRATION RATE: 18 BRPM | BODY MASS INDEX: 34.86 KG/M2 | HEIGHT: 68 IN

## 2021-12-02 DIAGNOSIS — F41.9 ANXIETY: Primary | ICD-10-CM

## 2021-12-02 PROCEDURE — 6370000000 HC RX 637 (ALT 250 FOR IP): Performed by: EMERGENCY MEDICINE

## 2021-12-02 PROCEDURE — 99284 EMERGENCY DEPT VISIT MOD MDM: CPT

## 2021-12-02 RX ORDER — LORAZEPAM 1 MG/1
2 TABLET ORAL EVERY 6 HOURS PRN
Qty: 18 TABLET | Refills: 0 | Status: SHIPPED | OUTPATIENT
Start: 2021-12-02 | End: 2021-12-05

## 2021-12-02 RX ORDER — LORAZEPAM 1 MG/1
2 TABLET ORAL ONCE
Status: COMPLETED | OUTPATIENT
Start: 2021-12-02 | End: 2021-12-02

## 2021-12-02 RX ADMIN — LORAZEPAM 2 MG: 1 TABLET ORAL at 08:08

## 2021-12-02 ASSESSMENT — ENCOUNTER SYMPTOMS
DIARRHEA: 0
VOMITING: 0
ABDOMINAL PAIN: 0
NAUSEA: 0
SORE THROAT: 0
COUGH: 0
SHORTNESS OF BREATH: 0
BACK PAIN: 0

## 2021-12-02 NOTE — ED PROVIDER NOTES
[facility] [facility] ED  eMERGENCYdEPARTMENT eNCOUnter      Pt Name: Chrissy Kilpatrick  MRN: [id number]  Dustingfmatt [birth year]  Date of evaluation: 12/2/2021  Nadeen Tai MD    CHIEF COMPLAINT           HPI  Chrissy Kilpatrick is a 61 y.o. female per chart review has a h/o HTN, depression, OA, CKD presents to the ED with anxiety. Pt notes gradual onset, moderate, constant, worsening anxiety x 3 days. Pt notes that she recently lost 8 members of her family in the past year due to covid and other illnesses. Pt used to see a counselor and her pcp however her pcp went on maternity leave. Pt has another appt on 12/17. Pt denies fever, n/v, cp, sob, ab pain, dysuria, diarrhea. Pt denies depression, SI/HI, AVH. ROS  Review of Systems   Constitutional: Negative for activity change, chills and fever. HENT: Negative for ear pain and sore throat. Eyes: Negative for visual disturbance. Respiratory: Negative for cough and shortness of breath. Cardiovascular: Negative for chest pain, palpitations and leg swelling. Gastrointestinal: Negative for abdominal pain, diarrhea, nausea and vomiting. Genitourinary: Negative for dysuria. Musculoskeletal: Negative for back pain. Skin: Negative for rash. Neurological: Negative for dizziness and weakness. Psychiatric/Behavioral: The patient is nervous/anxious and is hyperactive. Except as noted above the remainder of the review of systems was reviewed and negative.        PAST MEDICAL HISTORY     Past Medical History:   Diagnosis Date    Anxiety     Chronic back pain     Hypertension     Obesity     Pain     ankle and leg         SURGICAL HISTORY       Past Surgical History:   Procedure Laterality Date    BREAST SURGERY      CYST REMOVAL      FRACTURE SURGERY      HYSTERECTOMY           CURRENTMEDICATIONS       Previous Medications    ALLOPURINOL (ZYLOPRIM) 100 MG TABLET    TAKE 1 TABLET BY MOUTH DAILY    AMLODIPINE (NORVASC) 5 MG TABLET Take 1 tablet by mouth daily for 7 days    FEXOFENADINE (ALLEGRA) 180 MG TABLET    Take 1 tablet by mouth daily    FLUTICASONE (FLONASE) 50 MCG/ACT NASAL SPRAY    SHAKE LIQUID AND USE 1 SPRAY IN EACH NOSTRIL DAILY    LATANOPROST (XALATAN) 0.005 % OPHTHALMIC SOLUTION    INSTILL 1 DROP IN BOTH EYES EVERY EVENING    LISINOPRIL (PRINIVIL;ZESTRIL) 20 MG TABLET    Take 1 tablet by mouth daily    LORAZEPAM (ATIVAN) 2 MG TABLET    Take 1 tablet by mouth every 8 hours as needed for Anxiety for up to 7 days. METOPROLOL SUCCINATE (TOPROL XL) 100 MG EXTENDED RELEASE TABLET    TAKE 1 TABLET BY MOUTH DAILY    PREGABALIN (LYRICA) 100 MG CAPSULE    Take 100 mg by mouth 2 times daily.     TRAMADOL (ULTRAM) 50 MG TABLET    1-2 tablet by mouth 3 times a day  for pain prn    VITAMIN D (ERGOCALCIFEROL) 1.25 MG (25616 UT) CAPS CAPSULE    TAKE 1 CAPSULE BY MOUTH 1 TIME A WEEK    VRAYLAR 1.5 MG CAPSULE    TAKE 1 CAPSULE BY MOUTH EVERY DAY       ALLERGIES     Ibuprofen, Midazolam hcl, Nsaids, Sulfa antibiotics, and Sulfasalazine    FAMILY HISTORY       Family History   Problem Relation Age of Onset    Cancer Mother         esophageal    Seizures Father     Seizures Brother         back problems    Cancer Maternal Aunt         esophageal    Cancer Maternal Uncle         lung          SOCIAL HISTORY       Social History     Socioeconomic History    Marital status:      Spouse name: None    Number of children: None    Years of education: None    Highest education level: None   Occupational History    None   Tobacco Use    Smoking status: Never Smoker    Smokeless tobacco: Never Used   Substance and Sexual Activity    Alcohol use: No     Alcohol/week: 0.0 standard drinks    Drug use: No    Sexual activity: Yes     Comment: divorce   Other Topics Concern    None   Social History Narrative    None     Social Determinants of Health     Financial Resource Strain: Low Risk     Difficulty of Paying Living Expenses: Not Bowel sounds are normal. There is no distension. Palpations: Abdomen is soft. Tenderness: There is no abdominal tenderness. Musculoskeletal:         General: Normal range of motion. Cervical back: Normal range of motion and neck supple. Skin:     General: Skin is warm and dry. Neurological:      Mental Status: She is alert and oriented to person, place, and time. Psychiatric:         Mood and Affect: Mood normal.           MDM  60 yo female presents to the ED with anxiety. Pt is afebrile, hemodynamically stable. Pt noted to be hypertensive however states she does not take her meds. Pt takes 2 mg ativan PO TID. Pt educated thoroughly about anxiety. Pt without signs of benzo withdrawal.  Pt without tremors, tachycardia. Pt given PO ativan in the ED. Pt educated about benzo use and withdrawal.  Will give pt 3 days worth and pt educated about how to taper off benzos. Pt will take 2 mg PO ativan BID for the next 3 days then 1 mg PO ativan for the next 3 days. Pt has a counselor appt on 12/17. Pt given anxiety warning signs and will f/u with psych. FINAL IMPRESSION      1.  Anxiety          DISPOSITION/PLAN   DISPOSITION Discharge - Pending Orders Complete 12/02/2021 08:06:37 AM        DISCHARGE MEDICATIONS:  [unfilled]         Octavio Staton MD(electronically signed)  Attending Emergency Physician            Octavio Staton MD  12/02/21 0726

## 2021-12-16 ENCOUNTER — HOSPITAL ENCOUNTER (OUTPATIENT)
Dept: NON INVASIVE DIAGNOSTICS | Age: 66
Discharge: HOME | End: 2021-12-16
Payer: MEDICARE

## 2021-12-16 PROCEDURE — 93005 ELECTROCARDIOGRAM TRACING: CPT | Performed by: NURSE PRACTITIONER

## 2021-12-17 LAB
EKG ATRIAL RATE: 80 BPM
EKG P AXIS: 55 DEGREES
EKG P-R INTERVAL: 156 MS
EKG Q-T INTERVAL: 346 MS
EKG QRS DURATION: 80 MS
EKG QTC CALCULATION (BAZETT): 399 MS
EKG R AXIS: 5 DEGREES
EKG T AXIS: 50 DEGREES
EKG VENTRICULAR RATE: 80 BPM

## 2021-12-17 PROCEDURE — 93010 ELECTROCARDIOGRAM REPORT: CPT | Performed by: INTERNAL MEDICINE

## 2021-12-27 DIAGNOSIS — N18.31 STAGE 3A CHRONIC KIDNEY DISEASE (HCC): ICD-10-CM

## 2021-12-28 RX ORDER — AMLODIPINE BESYLATE 5 MG/1
5 TABLET ORAL DAILY
Qty: 90 TABLET | Refills: 4 | Status: SHIPPED | OUTPATIENT
Start: 2021-12-28 | End: 2022-08-22

## 2022-01-24 DIAGNOSIS — N18.31 STAGE 3A CHRONIC KIDNEY DISEASE (HCC): ICD-10-CM

## 2022-01-24 DIAGNOSIS — I10 BENIGN ESSENTIAL HTN: ICD-10-CM

## 2022-01-24 RX ORDER — LISINOPRIL 20 MG/1
20 TABLET ORAL DAILY
Qty: 90 TABLET | Refills: 1 | Status: SHIPPED | OUTPATIENT
Start: 2022-01-24 | End: 2022-04-25

## 2022-01-24 NOTE — TELEPHONE ENCOUNTER
Patient requesting medication refill.  Please approve or deny this request.    Rx requested:  Requested Prescriptions     Pending Prescriptions Disp Refills    lisinopril (PRINIVIL;ZESTRIL) 20 MG tablet 90 tablet 1     Sig: Take 1 tablet by mouth daily         Last Office Visit:   8/17/2021      Next Visit Date:  Future Appointments   Date Time Provider Joseph Roblero   2/1/2022  2:00 PM MADELAINE Santamaria [facility] [place]

## 2022-02-15 ENCOUNTER — VIRTUAL VISIT (OUTPATIENT)
Dept: FAMILY MEDICINE CLINIC | Age: 67
End: 2022-02-15
Payer: MEDICARE

## 2022-02-15 DIAGNOSIS — F43.0 PANIC STATE AS ACUTE REACTION TO STRESS: ICD-10-CM

## 2022-02-15 DIAGNOSIS — H40.89 OTHER GLAUCOMA OF BOTH EYES: ICD-10-CM

## 2022-02-15 DIAGNOSIS — I10 BENIGN ESSENTIAL HTN: Primary | ICD-10-CM

## 2022-02-15 DIAGNOSIS — E79.0 HYPERURICEMIA: ICD-10-CM

## 2022-02-15 DIAGNOSIS — N18.31 STAGE 3A CHRONIC KIDNEY DISEASE (HCC): ICD-10-CM

## 2022-02-15 DIAGNOSIS — F41.9 ANXIETY: ICD-10-CM

## 2022-02-15 DIAGNOSIS — E55.9 VITAMIN D DEFICIENCY: ICD-10-CM

## 2022-02-15 DIAGNOSIS — M47.816 ARTHROPATHY OF LUMBAR FACET JOINT: ICD-10-CM

## 2022-02-15 PROBLEM — F43.21 GRIEF REACTION: Status: RESOLVED | Noted: 2021-02-16 | Resolved: 2022-02-15

## 2022-02-15 PROBLEM — F43.20 GRIEF REACTION: Status: RESOLVED | Noted: 2021-02-16 | Resolved: 2022-02-15

## 2022-02-15 PROCEDURE — 99214 OFFICE O/P EST MOD 30 MIN: CPT | Performed by: PHYSICIAN ASSISTANT

## 2022-02-15 RX ORDER — LAMOTRIGINE 25 MG/1
TABLET ORAL
COMMUNITY
Start: 2022-01-05

## 2022-02-15 RX ORDER — LORAZEPAM 2 MG/1
TABLET ORAL
COMMUNITY
Start: 2022-01-05

## 2022-02-15 RX ORDER — ERGOCALCIFEROL 1.25 MG/1
CAPSULE ORAL
Qty: 12 CAPSULE | Refills: 4 | Status: SHIPPED | OUTPATIENT
Start: 2022-02-15

## 2022-02-15 ASSESSMENT — ENCOUNTER SYMPTOMS
ABDOMINAL DISTENTION: 0
BLOOD IN STOOL: 0
NAUSEA: 0
RECTAL PAIN: 0
COUGH: 0
SHORTNESS OF BREATH: 0
BACK PAIN: 1
SINUS PRESSURE: 0
CHEST TIGHTNESS: 0
ABDOMINAL PAIN: 0
SINUS PAIN: 0
DIARRHEA: 0
COLOR CHANGE: 0

## 2022-02-15 NOTE — PROGRESS NOTES
2/15/2022    TELEHEALTH EVALUATION -- Audio/Visual (During [id number]-[id number] public health emergency)    Due to COVID 19 outbreak, patient's office visit was converted to a virtual visit. Patient was contacted and agreed to proceed with a virtual visit via Telephone Visit total length of call 15-20 minutes. The risks and benefits of converting to a virtual visit were discussed in light of the current infectious disease epidemic. Patient also understood that insurance coverage and co-pays are up to their individual insurance plans. HPI:    Júnior Nelson (:  1955) has requested an audio/video evaluation for the following concern(s):    VV today for follow up. Last OV with me: 21. Anxiety, depression, panic disorder.   Still seeking out new psychiatrist.  Returned to [facility] for [facility] and therapy. Doing well with new provider.     Anxiety is currently managed with ativan 2 mg, TID. On lamictal, was weaned off of vraylar 1.5 mg--was making patient feel more depressed. Tolerating lamictal dosage. HTN/Elevated BP. Denies CP, QUINTERO, SOB. Compliant with current regimen. There is a history of CKD and HTN in her family.    No tobacco use. Very occasional alcohol.     Elevated uric acid. +joint pain. History of acute gout flares.    Was not consistent with taking her allopurinol. Has since started taking daily. Most likely contributory is her CKD; not taking HCTZ. Denies excessive red meat intake.      Glaucoma. Followed by [facility]. Eye pressures remains stable. She is compliant with her eye drops.      Seasonal allergies. Requesting refills at this time.        Review of Systems   Constitutional: Negative for activity change, appetite change, chills, diaphoresis, fatigue, fever and unexpected weight change. HENT: Negative for congestion, postnasal drip, sinus pressure, sinus pain and sneezing.     Respiratory: Negative for cough, chest tightness and shortness of breath. Cardiovascular: Negative for chest pain, palpitations and leg swelling. Gastrointestinal: Negative for abdominal distention, abdominal pain, blood in stool, diarrhea, nausea and rectal pain. Endocrine: Negative for polydipsia, polyphagia and polyuria. Genitourinary: Negative for decreased urine volume, enuresis, frequency, urgency, vaginal bleeding and vaginal discharge. Musculoskeletal: Positive for arthralgias, back pain and myalgias. Negative for gait problem and joint swelling. Chronic   Skin: Negative for color change and rash. Allergic/Immunologic: Positive for environmental allergies. Neurological: Negative for dizziness, light-headedness and headaches (tension-type). Psychiatric/Behavioral: Negative for agitation, dysphoric mood, self-injury, sleep disturbance and suicidal ideas. The patient is not nervous/anxious. Prior to Visit Medications    Medication Sig Taking?  Authorizing Provider   lamoTRIgine (LAMICTAL) 25 MG tablet  Yes Historical Provider, MD   LORazepam (ATIVAN) 2 MG tablet  Yes Historical Provider, MD   allopurinol (ZYLOPRIM) 100 MG tablet TAKE 1 TABLET BY MOUTH DAILY Yes Norma Espinosa PA-C   lisinopril (PRINIVIL;ZESTRIL) 20 MG tablet Take 1 tablet by mouth daily Yes Norma Espinosa PA-C   amLODIPine (NORVASC) 5 MG tablet Take 1 tablet by mouth daily for 7 days Yes Norma Espinosa PA-C   metoprolol succinate (TOPROL XL) 100 MG extended release tablet TAKE 1 TABLET BY MOUTH DAILY Yes Evert Campa MD   latanoprost (XALATAN) 0.005 % ophthalmic solution INSTILL 1 DROP IN BOTH EYES EVERY EVENING Yes Historical Provider, MD   fexofenadine (ALLEGRA) 180 MG tablet Take 1 tablet by mouth daily Yes Norma Espinosa PA-C   fluticasone (FLONASE) 50 MCG/ACT nasal spray SHAKE LIQUID AND USE 1 SPRAY IN EACH NOSTRIL DAILY Yes Norma Espinosa PA-C   vitamin D (ERGOCALCIFEROL) 1.25 MG (76040 UT) CAPS capsule TAKE 1 CAPSULE BY MOUTH 1 TIME A WEEK Yes Norma Espinosa PA-C   pregabalin (LYRICA) 100 MG capsule Take 100 mg by mouth 2 times daily. Yes Historical Provider, MD   traMADol (ULTRAM) 50 MG tablet 1-2 tablet by mouth 3 times a day  for pain prn Yes Aida Godfrey MD       Social History     Tobacco Use    Smoking status: Never Smoker    Smokeless tobacco: Never Used   Substance Use Topics    Alcohol use: No     Alcohol/week: 0.0 standard drinks    Drug use: No        Allergies   Allergen Reactions    Ibuprofen Other (See Comments)    Midazolam Hcl      Other reaction(s):  Other: See Comments  Makes her feel more anxious    Nsaids Other (See Comments)    Sulfa Antibiotics Other (See Comments)     elevated blood pressure    Sulfasalazine Other (See Comments)     elevated blood pressure   ,   Past Medical History:   Diagnosis Date    Anxiety     Chronic back pain     Hypertension     Obesity     Pain     ankle and leg   ,   Past Surgical History:   Procedure Laterality Date    BREAST SURGERY      CYST REMOVAL      FRACTURE SURGERY      HYSTERECTOMY     ,   Social History     Tobacco Use    Smoking status: Never Smoker    Smokeless tobacco: Never Used   Substance Use Topics    Alcohol use: No     Alcohol/week: 0.0 standard drinks    Drug use: No   ,   Family History   Problem Relation Age of Onset    Cancer Mother         esophageal    Seizures Father     Seizures Brother         back problems    Cancer Maternal Aunt         esophageal    Cancer Maternal Uncle         lung   ,   Immunization History   Administered Date(s) Administered    COVID-19, Pfizer Purple top, DILUTE for use, 12+ yrs, 30mcg/0.3mL dose 03/25/2021, 04/15/2021, 10/19/2021   ,   Health Maintenance   Topic Date Due    Flu vaccine (1) Never done    DTaP/Tdap/Td vaccine (1 - Tdap) 06/21/2022 (Originally 8/14/1974)    Shingles Vaccine (1 of 2) 06/21/2022 (Originally 8/14/2007)    Depression Screen  08/17/2022    Annual Wellness Visit (AWV)  08/18/2022    Potassium monitoring  12/16/2022    Creatinine monitoring  12/16/2022    Breast cancer screen  02/22/2023    Diabetes screen  06/21/2024    Colon cancer screen colonoscopy  05/09/2026    Lipid screen  12/16/2026    COVID-19 Vaccine  Completed    Hepatitis C screen  Completed    HIV screen  Completed    Hepatitis A vaccine  Aged Out    Hepatitis B vaccine  Aged Out    Hib vaccine  Aged Out    Meningococcal (ACWY) vaccine  Aged Out    Pneumococcal 0-64 years Vaccine  Aged Out       PHYSICAL EXAMINATION:  [ INSTRUCTIONS:  \"[x]\" Indicates a positive item  \"[]\" Indicates a negative item  -- DELETE ALL ITEMS NOT EXAMINED]  [x] Alert  [x] Oriented to person/place/time    [] No apparent distress  [] Toxic appearing    [] Face flushed appearing [] Sclera clear  [] Lips are cyanotic      [x] Breathing appears normal  [] Appears tachypneic      [] Rash on visible skin    [] Cranial Nerves II-XII grossly intact    [] Motor grossly intact in visible upper extremities    [] Motor grossly intact in visible lower extremities    [x] Normal Mood  [] Anxious appearing    [] Depressed appearing  [] Confused appearing      [] Poor short term memory  [] Poor long term memory    [] OTHER:      Due to this being a TeleHealth encounter, evaluation of the following organ systems is limited: Vitals/Constitutional/EENT/Resp/CV/GI//MS/Neuro/Skin/Heme-Lymph-Imm. ASSESSMENT/PLAN:  1. Benign essential HTN      2. Arthropathy of lumbar facet joint      3. Stage 3a chronic kidney disease ([facility] [address]    4. Vitamin D deficiency    5. Panic state as acute reaction to stress    6. Other glaucoma of both eyes    7. Hyperuricemia    8. Anxiety    9. BMI 34.0-34.9,adult    Continue current medications. Doing well. Continue with [facility]. 3 month follow up. Return in about 3 months (around 5/15/2022) for follow up in office. An  electronic signature was used to authenticate this note.     --Chula Kendrick PA-C on 2/15/2022 at 10:14 AM        Pursuant to the emergency declaration under the [facility], [facility] waiver authority and the [employer] Act, this Virtual  Visit was conducted, with patient's consent, to reduce the patient's risk of exposure to COVID-19 and provide continuity of care for an established patient. Services were provided through a video synchronous discussion virtually to substitute for in-person clinic visit.

## 2022-03-24 DIAGNOSIS — Z76.0 MEDICATION REFILL: ICD-10-CM

## 2022-03-24 DIAGNOSIS — I10 BENIGN ESSENTIAL HTN: ICD-10-CM

## 2022-03-24 RX ORDER — METOPROLOL SUCCINATE 100 MG/1
TABLET, EXTENDED RELEASE ORAL
Qty: 90 TABLET | Refills: 0 | Status: SHIPPED | OUTPATIENT
Start: 2022-03-24 | End: 2022-06-01

## 2022-03-24 NOTE — TELEPHONE ENCOUNTER
pharmacy requesting medication refill.  Please approve or deny this request.    Rx requested:  Requested Prescriptions     Pending Prescriptions Disp Refills    metoprolol succinate (TOPROL XL) 100 MG extended release tablet [Pharmacy Med Name: METOPROLOL SUCCINATE  100 Tablet] 90 tablet 0     Sig: TAKE 1 TABLET BY MOUTH ONCE A DAY         Last Office Visit:   2/15/2022      Next Visit Date:  Future Appointments   Date Time Provider Joseph Roblero   5/16/2022  2:45 PM MADELAINE Huizar [facility]

## 2022-04-23 DIAGNOSIS — I10 BENIGN ESSENTIAL HTN: ICD-10-CM

## 2022-04-23 DIAGNOSIS — N18.31 STAGE 3A CHRONIC KIDNEY DISEASE (HCC): ICD-10-CM

## 2022-04-25 RX ORDER — LISINOPRIL 20 MG/1
TABLET ORAL
Qty: 90 TABLET | Refills: 1 | Status: SHIPPED | OUTPATIENT
Start: 2022-04-25 | End: 2022-10-19

## 2022-04-25 NOTE — TELEPHONE ENCOUNTER
Rx request   Requested Prescriptions     Pending Prescriptions Disp Refills    lisinopril (PRINIVIL;ZESTRIL) 20 MG tablet [Pharmacy Med Name: LISINOPRIL 20 MG TABLET 20 Tablet] 90 tablet 1     Sig: TAKE 1 TABLET BY MOUTH ONCE A DAY     LOV 2/15/2022  Next Visit Date:  Future Appointments   Date Time Provider Joseph Roblero   5/16/2022  2:45 PM MADELAINE Miranda [facility]

## 2022-05-16 ENCOUNTER — OFFICE VISIT (OUTPATIENT)
Dept: FAMILY MEDICINE CLINIC | Age: 67
End: 2022-05-16
Payer: MEDICARE

## 2022-05-16 VITALS
SYSTOLIC BLOOD PRESSURE: 126 MMHG | OXYGEN SATURATION: 95 % | RESPIRATION RATE: 16 BRPM | BODY MASS INDEX: 32.89 KG/M2 | DIASTOLIC BLOOD PRESSURE: 84 MMHG | HEART RATE: 62 BPM | WEIGHT: 217 LBS | HEIGHT: 68 IN

## 2022-05-16 DIAGNOSIS — F41.9 ANXIETY: ICD-10-CM

## 2022-05-16 DIAGNOSIS — I10 BENIGN ESSENTIAL HTN: ICD-10-CM

## 2022-05-16 DIAGNOSIS — I10 BENIGN ESSENTIAL HTN: Primary | ICD-10-CM

## 2022-05-16 DIAGNOSIS — E79.0 HYPERURICEMIA: ICD-10-CM

## 2022-05-16 DIAGNOSIS — N18.31 STAGE 3A CHRONIC KIDNEY DISEASE (HCC): ICD-10-CM

## 2022-05-16 LAB
ALBUMIN SERPL-MCNC: 4.4 G/DL (ref 3.5–4.6)
ALP BLD-CCNC: 86 U/L (ref 40–130)
ALT SERPL-CCNC: 17 U/L (ref 0–33)
ANION GAP SERPL CALCULATED.3IONS-SCNC: 15 MEQ/L (ref 9–15)
AST SERPL-CCNC: 17 U/L (ref 0–35)
BILIRUB SERPL-MCNC: 0.8 MG/DL (ref 0.2–0.7)
BUN BLDV-MCNC: 13 MG/DL (ref 8–23)
CALCIUM SERPL-MCNC: 9.5 MG/DL (ref 8.5–9.9)
CHLORIDE BLD-SCNC: 105 MEQ/L (ref 95–107)
CO2: 25 MEQ/L (ref 20–31)
CREAT SERPL-MCNC: 0.88 MG/DL (ref 0.5–0.9)
GFR AFRICAN AMERICAN: >60
GFR NON-AFRICAN AMERICAN: >60
GLOBULIN: 3.3 G/DL (ref 2.3–3.5)
GLUCOSE BLD-MCNC: 92 MG/DL (ref 70–99)
HCT VFR BLD CALC: 38.7 % (ref 37–47)
HEMOGLOBIN: 12.2 G/DL (ref 12–16)
MCH RBC QN AUTO: 27.3 PG (ref 27–31.3)
MCHC RBC AUTO-ENTMCNC: 31.6 % (ref 33–37)
MCV RBC AUTO: 86.4 FL (ref 82–100)
PDW BLD-RTO: 14 % (ref 11.5–14.5)
PLATELET # BLD: 243 K/UL (ref 130–400)
POTASSIUM SERPL-SCNC: 4.1 MEQ/L (ref 3.4–4.9)
RBC # BLD: 4.47 M/UL (ref 4.2–5.4)
SODIUM BLD-SCNC: 145 MEQ/L (ref 135–144)
TOTAL PROTEIN: 7.7 G/DL (ref 6.3–8)
WBC # BLD: 4.9 K/UL (ref 4.8–10.8)

## 2022-05-16 PROCEDURE — 99214 OFFICE O/P EST MOD 30 MIN: CPT | Performed by: PHYSICIAN ASSISTANT

## 2022-05-16 NOTE — PROGRESS NOTES
Subjective  Dorie Paco, 61 y.o. female presents today with:  Chief Complaint   Patient presents with    3 Month Follow-Up     HPI  Delmi Headley is in the office today for follow up. Last OV with me: 2/15/22 via VV. Anxiety, depression, panic disorder.   Returned to [facility] for [facility] and therapy. Doing well with new provider.     Anxiety is currently managed with ativan 2 mg, TID. On lamictal, was weaned off of vraylar 1.5 mg--was making patient feel more depressed. Tolerating lamictal dosage. Notes to have some increased stress/anxiety recently related to some family events. Helping watch her great-nephew.      HTN/Elevated BP. Denies CP, QUINTERO, SOB. Compliant with current regimen. There is a history of CKD and HTN in her family.    No tobacco use. Very occasional alcohol.     Elevated uric acid. History of acute gout flares.    Stable with allopurinol. Denies recent joint pain. Glaucoma. [facility]. Eye pressures remains stable. She is compliant with her eye drops.      Seasonal allergies. Requesting refills at this time.        Review of Systems   Constitutional: Negative for activity change, appetite change, chills, diaphoresis, fatigue, fever and unexpected weight change. HENT: Negative for congestion, postnasal drip, sinus pressure, sinus pain and sneezing. Respiratory: Negative for cough, chest tightness and shortness of breath. Cardiovascular: Negative for chest pain, palpitations and leg swelling. Gastrointestinal: Negative for abdominal distention, abdominal pain, blood in stool, diarrhea, nausea and rectal pain. Endocrine: Negative for polydipsia, polyphagia and polyuria. Genitourinary: Negative for decreased urine volume, enuresis, frequency, urgency, vaginal bleeding and vaginal discharge. Musculoskeletal: Positive for arthralgias, back pain and myalgias. Negative for gait problem and joint swelling.         Chronic   Skin: Negative for color change and rash. Allergic/Immunologic: Positive for environmental allergies. Neurological: Negative for dizziness, light-headedness and headaches (tension-type). Psychiatric/Behavioral: Negative for agitation, dysphoric mood, self-injury, sleep disturbance and suicidal ideas. The patient is nervous/anxious. Past Medical History:   Diagnosis Date    Anxiety     Chronic back pain     Hypertension     Obesity     Pain     ankle and leg     Past Surgical History:   Procedure Laterality Date    BREAST SURGERY      CYST REMOVAL      FRACTURE SURGERY      HYSTERECTOMY       Social History     Socioeconomic History    Marital status:      Spouse name: Not on file    Number of children: Not on file    Years of education: Not on file    Highest education level: Not on file   Occupational History    Not on file   Tobacco Use    Smoking status: Never Smoker    Smokeless tobacco: Never Used   Substance and Sexual Activity    Alcohol use: No     Alcohol/week: 0.0 standard drinks    Drug use: No    Sexual activity: Yes     Comment: divorce   Other Topics Concern    Not on file   Social History Narrative    Not on file     Social Determinants of Health     Financial Resource Strain: Low Risk     Difficulty of Paying Living Expenses: Not hard at all   Food Insecurity: No Food Insecurity    Worried About Running Out of Food in the Last Year: Never true    Morgan of Food in the Last Year: Never true   Transportation Needs:     Lack of Transportation (Medical): Not on file    Lack of Transportation (Non-Medical):  Not on file   Physical Activity:     Days of Exercise per Week: Not on file    Minutes of Exercise per Session: Not on file   Stress:     Feeling of Stress : Not on file   Social Connections:     Frequency of Communication with Friends and Family: Not on file    Frequency of Social Gatherings with Friends and Family: Not on file    Attends Rastafari Services: Not on file    Active Member of Clubs or Organizations: Not on file    Attends Club or Organization Meetings: Not on file    Marital Status: Not on file   Intimate Partner Violence:     Fear of Current or Ex-Partner: Not on file    Emotionally Abused: Not on file    Physically Abused: Not on file    Sexually Abused: Not on file   Housing Stability:     Unable to Pay for Housing in the Last Year: Not on file    Number of [place] in the Last Year: Not on file    Unstable Housing in the Last Year: Not on file     Family History   Problem Relation Age of Onset    Cancer Mother         esophageal    Seizures Father     Seizures Brother         back problems    Cancer Maternal Aunt         esophageal    Cancer Maternal Uncle         lung     Allergies   Allergen Reactions    Ibuprofen Other (See Comments)    Midazolam Hcl      Other reaction(s):  Other: See Comments  Makes her feel more anxious    Nsaids Other (See Comments)    Sulfa Antibiotics Other (See Comments)     elevated blood pressure    Sulfasalazine Other (See Comments)     elevated blood pressure     Current Outpatient Medications   Medication Sig Dispense Refill    lisinopril (PRINIVIL;ZESTRIL) 20 MG tablet TAKE 1 TABLET BY MOUTH ONCE A DAY 90 tablet 1    metoprolol succinate (TOPROL XL) 100 MG extended release tablet TAKE 1 TABLET BY MOUTH ONCE A DAY 90 tablet 0    lamoTRIgine (LAMICTAL) 25 MG tablet       LORazepam (ATIVAN) 2 MG tablet       vitamin D (ERGOCALCIFEROL) 1.25 MG (63382 UT) CAPS capsule TAKE 1 CAPSULE BY MOUTH 1 TIME A WEEK 12 capsule 4    allopurinol (ZYLOPRIM) 100 MG tablet TAKE 1 TABLET BY MOUTH DAILY 90 tablet 1    amLODIPine (NORVASC) 5 MG tablet Take 1 tablet by mouth daily for 7 days 90 tablet 4    latanoprost (XALATAN) 0.005 % ophthalmic solution INSTILL 1 DROP IN BOTH EYES EVERY EVENING      fexofenadine (ALLEGRA) 180 MG tablet Take 1 tablet by mouth daily 90 tablet 1    fluticasone (FLONASE) 50 MCG/ACT nasal spray SHAKE LIQUID AND USE 1 SPRAY IN EACH NOSTRIL DAILY 48 g 3    pregabalin (LYRICA) 100 MG capsule Take 100 mg by mouth 2 times daily.  traMADol (ULTRAM) 50 MG tablet 1-2 tablet by mouth 3 times a day  for pain prn 120 tablet 0     No current facility-administered medications for this visit. PMH, Surgical Hx, Family Hx, and Social Hx reviewed and updated. Health Maintenance reviewed. Objective  Vitals:    05/16/22 1438 05/16/22 1442 05/16/22 1504   BP: (!) 138/92 (!) 138/90 126/84   Site: Left Upper Arm Left Upper Arm Left Upper Arm   Position: Sitting Sitting Sitting   Cuff Size: Large Adult Large Adult Large Adult   Pulse: 62     Resp: 16     SpO2: 95%     Weight: 217 lb (98.4 kg)     Height: 5' 8\" (1.727 m)       BP Readings from Last 3 Encounters:   05/16/22 126/84   12/02/21 (!) 163/100   11/26/21 (!) 161/92     Wt Readings from Last 3 Encounters:   05/16/22 217 lb (98.4 kg)   12/02/21 230 lb (104.3 kg)   11/26/21 230 lb (104.3 kg)     Physical Exam  Vitals reviewed. Constitutional:       Appearance: She is obese. HENT:      Head: Normocephalic and atraumatic. Right Ear: External ear normal.      Left Ear: External ear normal.   Cardiovascular:      Rate and Rhythm: Normal rate and regular rhythm. Pulmonary:      Effort: Pulmonary effort is normal.      Breath sounds: Normal breath sounds. Skin:     General: Skin is warm. Neurological:      General: No focal deficit present. Mental Status: She is alert and oriented to person, place, and time. Psychiatric:         Attention and Perception: Attention normal.         Mood and Affect: Mood is not anxious. Affect is not tearful. Speech: Speech normal.         Behavior: Behavior normal.         Thought Content: Thought content normal.         Cognition and Memory: Cognition normal.         Judgment: Judgment normal.      Comments: Improved mood noted today. Less anxious. Doing OK at this time. Assessment & Plan   Emily Johnson was seen today for 3 month follow-up. Diagnoses and all orders for this visit:    Benign essential HTN  -     CBC; Future  -     Comprehensive Metabolic Panel; Future    Stage 3a chronic kidney disease (HCC)  -     CBC; Future  -     Comprehensive Metabolic Panel; Future    Anxiety    BMI 32.0-32.9,adult  -     CBC; Future  -     Comprehensive Metabolic Panel; Future    Hyperuricemia    Routine labs. Continue current medications. 3 month follow up. Orders Placed This Encounter   Procedures    CBC     Standing Status:   Future     Number of Occurrences:   1     Standing Expiration Date:   5/16/2023    Comprehensive Metabolic Panel     Standing Status:   Future     Number of Occurrences:   1     Standing Expiration Date:   5/16/2023     No orders of the defined types were placed in this encounter. There are no discontinued medications. No follow-ups on file. Reviewed with the patient: current clinical status, medications, activities and diet. Side effects, adverse effects of the medication prescribed today, as well as treatment plan/ rationale and result expectations have been discussed with the patient who expresses understanding and desires to proceed. Close follow up to evaluate treatment results and for coordination of care. I have reviewed the patient's medical history in detail and updated the computerized patient record.     Norma Espinosa PA-C

## 2022-05-30 PROBLEM — R03.0 ELEVATED BLOOD PRESSURE READING: Status: RESOLVED | Noted: 2021-06-21 | Resolved: 2022-05-30

## 2022-05-30 ASSESSMENT — ENCOUNTER SYMPTOMS
COUGH: 0
SINUS PAIN: 0
ABDOMINAL PAIN: 0
SHORTNESS OF BREATH: 0
BLOOD IN STOOL: 0
SINUS PRESSURE: 0
BACK PAIN: 1
COLOR CHANGE: 0
CHEST TIGHTNESS: 0
ABDOMINAL DISTENTION: 0
NAUSEA: 0
DIARRHEA: 0
RECTAL PAIN: 0

## 2022-06-01 DIAGNOSIS — Z76.0 MEDICATION REFILL: ICD-10-CM

## 2022-06-01 DIAGNOSIS — I10 BENIGN ESSENTIAL HTN: ICD-10-CM

## 2022-06-01 RX ORDER — METOPROLOL SUCCINATE 100 MG/1
TABLET, EXTENDED RELEASE ORAL
Qty: 90 TABLET | Refills: 4 | Status: SHIPPED | OUTPATIENT
Start: 2022-06-01

## 2022-06-01 NOTE — TELEPHONE ENCOUNTER
pharmacy requesting medication refill.  Please approve or deny this request.    Rx requested:  Requested Prescriptions     Pending Prescriptions Disp Refills    metoprolol succinate (TOPROL XL) 100 MG extended release tablet [Pharmacy Med Name: METOPROLOL SUCC  MG T 100 MG Tablet] 90 tablet 0     Sig: TAKE 1 TABLET BY MOUTH ONCE A DAY         Last Office Visit:   5/16/2022      Next Visit Date:  Future Appointments   Date Time Provider Joseph Roblero   8/16/2022 10:00 AM [facility], PAJAVIER [address]

## 2022-08-16 LAB — CHOLESTEROL (LIPID PANEL): NORMAL

## 2022-08-22 ENCOUNTER — TELEMEDICINE (OUTPATIENT)
Dept: FAMILY MEDICINE CLINIC | Age: 67
End: 2022-08-22
Payer: MEDICARE

## 2022-08-22 DIAGNOSIS — Z12.31 BREAST CANCER SCREENING BY MAMMOGRAM: ICD-10-CM

## 2022-08-22 DIAGNOSIS — M16.0 OSTEOARTHRITIS OF BOTH HIPS, UNSPECIFIED OSTEOARTHRITIS TYPE: ICD-10-CM

## 2022-08-22 DIAGNOSIS — F41.9 ANXIETY: ICD-10-CM

## 2022-08-22 DIAGNOSIS — I10 BENIGN ESSENTIAL HTN: ICD-10-CM

## 2022-08-22 DIAGNOSIS — H40.89 OTHER GLAUCOMA OF BOTH EYES: ICD-10-CM

## 2022-08-22 DIAGNOSIS — N18.31 STAGE 3A CHRONIC KIDNEY DISEASE (HCC): Primary | ICD-10-CM

## 2022-08-22 DIAGNOSIS — M47.816 ARTHROPATHY OF LUMBAR FACET JOINT: ICD-10-CM

## 2022-08-22 DIAGNOSIS — Z00.00 MEDICARE ANNUAL WELLNESS VISIT, SUBSEQUENT: Primary | ICD-10-CM

## 2022-08-22 PROCEDURE — 99214 OFFICE O/P EST MOD 30 MIN: CPT | Performed by: PHYSICIAN ASSISTANT

## 2022-08-22 PROCEDURE — 1123F ACP DISCUSS/DSCN MKR DOCD: CPT | Performed by: PHYSICIAN ASSISTANT

## 2022-08-22 PROCEDURE — G0439 PPPS, SUBSEQ VISIT: HCPCS | Performed by: PHYSICIAN ASSISTANT

## 2022-08-22 RX ORDER — PREGABALIN 50 MG/1
CAPSULE ORAL
COMMUNITY
Start: 2022-08-10

## 2022-08-22 RX ORDER — NALOXONE HYDROCHLORIDE 4 MG/.1ML
SPRAY NASAL
COMMUNITY
Start: 2022-08-10

## 2022-08-22 SDOH — ECONOMIC STABILITY: FOOD INSECURITY: WITHIN THE PAST 12 MONTHS, YOU WORRIED THAT YOUR FOOD WOULD RUN OUT BEFORE YOU GOT MONEY TO BUY MORE.: NEVER TRUE

## 2022-08-22 SDOH — ECONOMIC STABILITY: FOOD INSECURITY: WITHIN THE PAST 12 MONTHS, THE FOOD YOU BOUGHT JUST DIDN'T LAST AND YOU DIDN'T HAVE MONEY TO GET MORE.: NEVER TRUE

## 2022-08-22 ASSESSMENT — PATIENT HEALTH QUESTIONNAIRE - PHQ9
SUM OF ALL RESPONSES TO PHQ9 QUESTIONS 1 & 2: 0
SUM OF ALL RESPONSES TO PHQ QUESTIONS 1-9: 0
1. LITTLE INTEREST OR PLEASURE IN DOING THINGS: 0
2. FEELING DOWN, DEPRESSED OR HOPELESS: 0
SUM OF ALL RESPONSES TO PHQ QUESTIONS 1-9: 0

## 2022-08-22 ASSESSMENT — ENCOUNTER SYMPTOMS
SINUS PRESSURE: 0
DIARRHEA: 0
BLOOD IN STOOL: 0
SINUS PAIN: 0
ABDOMINAL PAIN: 0
CHEST TIGHTNESS: 0
NAUSEA: 0
SHORTNESS OF BREATH: 0
ABDOMINAL DISTENTION: 0
RECTAL PAIN: 0
COUGH: 0
BACK PAIN: 1
COLOR CHANGE: 0

## 2022-08-22 ASSESSMENT — LIFESTYLE VARIABLES: HOW OFTEN DO YOU HAVE A DRINK CONTAINING ALCOHOL: NEVER

## 2022-08-22 ASSESSMENT — SOCIAL DETERMINANTS OF HEALTH (SDOH): HOW HARD IS IT FOR YOU TO PAY FOR THE VERY BASICS LIKE FOOD, HOUSING, MEDICAL CARE, AND HEATING?: NOT HARD AT ALL

## 2022-08-22 NOTE — PROGRESS NOTES
2022    TELEHEALTH EVALUATION -- Audio/Visual (During JPGOM-15 public health emergency)    Due to COVID 19 outbreak, patient's office visit was converted to a virtual visit. Patient was contacted and agreed to proceed with a virtual visit via Telephone Visit--total length of call 15-20 minutes. The risks and benefits of converting to a virtual visit were discussed in light of the current infectious disease epidemic. Patient also understood that insurance coverage and co-pays are up to their individual insurance plans. HPI:    Eleazar Still (:  1955) has requested an audio/video evaluation for the following concern(s):    VV today for routine follow up. Last OV with me: 22    Anxiety, depression, panic disorder. Returned to [facility] for [facility] and therapy. Doing well with new provider. Anxiety is currently managed with ativan 2 mg, TID. On lamictal, was weaned off of vraylar 1.5 mg--was making patient feel more depressed. Tolerating lamictal dosage. Stress level has been better. HTN/Elevated BP. Denies CP, QUINTERO, SOB. Compliant with current regimen. There is a history of CKD and HTN in her family. No tobacco use. Very occasional alcohol. Elevated uric acid. History of acute gout flares. Stable with allopurinol. Denies recent joint pain. Glaucoma. Followed by [facility]. Eye pressures remains stable. She is compliant with her eye drops. Seasonal allergies. Requesting refills at this time. Chronic joint/back pain. Continues with pain management at [facility]. Chronic back pain and ankle pain. Recently signed up for outpatient PT. Taking tramadol 1-2 times daily. MRI of back from 2019 shows severe spinal stenosis. +neuropathy. She is on lyrica, 50 mg (twice daily). No change.      Review of Systems   Constitutional:  Negative for activity change, appetite change, chills, diaphoresis, fatigue, fever and unexpected weight change. HENT:  Negative for congestion, postnasal drip, sinus pressure, sinus pain and sneezing. Respiratory:  Negative for cough, chest tightness and shortness of breath. Cardiovascular:  Negative for chest pain, palpitations and leg swelling. Gastrointestinal:  Negative for abdominal distention, abdominal pain, blood in stool, diarrhea, nausea and rectal pain. Endocrine: Negative for polydipsia, polyphagia and polyuria. Genitourinary:  Negative for decreased urine volume, enuresis, frequency, urgency, vaginal bleeding and vaginal discharge. Musculoskeletal:  Positive for arthralgias, back pain and myalgias. Negative for gait problem and joint swelling. Chronic   Skin:  Negative for color change and rash. Allergic/Immunologic: Positive for environmental allergies. Neurological:  Negative for dizziness, light-headedness and headaches (tension-type). Psychiatric/Behavioral:  Negative for agitation, dysphoric mood, self-injury, sleep disturbance and suicidal ideas. The patient is not nervous/anxious. Prior to Visit Medications    Medication Sig Taking?  Authorizing Provider   pregabalin (LYRICA) 50 MG capsule  Yes Historical Provider, MD   naloxone 4 MG/0.1ML LIQD nasal spray If suspect overdose/unresponsiveness, 1 spray into nostril, may repeat 2-3 min alternating nostril until response achieved or EMS arrives Yes Historical Provider, MD   metoprolol succinate (TOPROL XL) 100 MG extended release tablet TAKE 1 TABLET BY MOUTH ONCE A DAY Yes Norma Espinosa PA-C   lisinopril (PRINIVIL;ZESTRIL) 20 MG tablet TAKE 1 TABLET BY MOUTH ONCE A DAY Yes Norma Espinosa PA-C   lamoTRIgine (LAMICTAL) 25 MG tablet  Yes Historical Provider, MD   LORazepam (ATIVAN) 2 MG tablet  Yes Historical Provider, MD   vitamin D (ERGOCALCIFEROL) 1.25 MG (94386 UT) CAPS capsule TAKE 1 CAPSULE BY MOUTH 1 TIME A WEEK Yes Norma Espinosa PA-C   allopurinol (ZYLOPRIM) 100 MG tablet TAKE 1 TABLET BY MOUTH DAILY Yes Norma ORTIZ MADELANIE Espinosa   amLODIPine (NORVASC) 5 MG tablet Take 1 tablet by mouth daily for 7 days Yes Norma Espinosa PA-C   latanoprost (XALATAN) 0.005 % ophthalmic solution INSTILL 1 DROP IN BOTH EYES EVERY EVENING Yes Historical Provider, MD   fexofenadine (ALLEGRA) 180 MG tablet Take 1 tablet by mouth daily Yes Norma Espinosa PA-C   fluticasone (FLONASE) 50 MCG/ACT nasal spray SHAKE LIQUID AND USE 1 SPRAY IN EACH NOSTRIL DAILY Yes Norma Espinosa PA-C   pregabalin (LYRICA) 100 MG capsule Take 100 mg by mouth 2 times daily. Yes Historical Provider, MD   traMADol (ULTRAM) 50 MG tablet 1-2 tablet by mouth 3 times a day  for pain prn Yes Abimbola Alamo MD       Social History     Tobacco Use    Smoking status: Never    Smokeless tobacco: Never   Substance Use Topics    Alcohol use: No     Alcohol/week: 0.0 standard drinks    Drug use: No        Allergies   Allergen Reactions    Ibuprofen Other (See Comments)    Midazolam Hcl      Other reaction(s):  Other: See Comments  Makes her feel more anxious    Nsaids Other (See Comments)    Sulfa Antibiotics Other (See Comments)     elevated blood pressure    Sulfasalazine Other (See Comments)     elevated blood pressure   ,   Past Medical History:   Diagnosis Date    Anxiety     Chronic back pain     Hypertension     Obesity     Pain     ankle and leg   ,   Past Surgical History:   Procedure Laterality Date    BREAST SURGERY      CYST REMOVAL      FRACTURE SURGERY      HYSTERECTOMY     ,   Social History     Tobacco Use    Smoking status: Never    Smokeless tobacco: Never   Substance Use Topics    Alcohol use: No     Alcohol/week: 0.0 standard drinks    Drug use: No   ,   Family History   Problem Relation Age of Onset    Cancer Mother         esophageal    Seizures Father     Seizures Brother         back problems    Cancer Maternal Aunt         esophageal    Cancer Maternal Uncle         lung   ,   Immunization History   Administered Date(s) Administered    COVID-19, PFIZER PURPLE top, DILUTE for use, (age 15 y+), 30mcg/0.3mL 03/25/2021, 04/15/2021, 10/19/2021   ,   Health Maintenance   Topic Date Due    Shingles vaccine (1 of 2) Never done    DEXA (modify frequency per FRAX score)  Never done    COVID-19 Vaccine (4 - Booster for Pfizer series) 02/19/2022    Pneumococcal 65+ years Vaccine (1 - PCV) Never done    Depression Screen  08/17/2022    DTaP/Tdap/Td vaccine (1 - Tdap) 09/12/2022 (Originally 8/14/1974)    Flu vaccine (1) 09/01/2022    Breast cancer screen  02/22/2023    Annual Wellness Visit (AWV)  08/23/2023    Colorectal Cancer Screen  05/09/2026    Lipids  12/16/2026    Hepatitis C screen  Completed    HIV screen  Completed    Hepatitis A vaccine  Aged Out    Hepatitis B vaccine  Aged Out    Hib vaccine  Aged Out    Meningococcal (ACWY) vaccine  Aged Out       PHYSICAL EXAMINATION:  [ INSTRUCTIONS:  \"[x]\" Indicates a positive item  \"[]\" Indicates a negative item  -- DELETE ALL ITEMS NOT EXAMINED]  [x] Alert  [x] Oriented to person/place/time    [] No apparent distress  [] Toxic appearing    [] Face flushed appearing [] Sclera clear  [] Lips are cyanotic      [x] Breathing appears normal  [] Appears tachypneic      [] Rash on visible skin    [] Cranial Nerves II-XII grossly intact    [] Motor grossly intact in visible upper extremities    [] Motor grossly intact in visible lower extremities    [x] Normal Mood  [] Anxious appearing    [] Depressed appearing  [] Confused appearing      [] Poor short term memory  [] Poor long term memory    [] OTHER:      Due to this being a TeleHealth encounter, evaluation of the following organ systems is limited: Vitals/Constitutional/EENT/Resp/CV/GI//MS/Neuro/Skin/Heme-Lymph-Imm. ASSESSMENT/PLAN:  1. Stage 3a chronic kidney disease (HCC)    - Uric Acid; Future    2. Benign essential HTN    - Uric Acid; Future    3. Arthropathy of lumbar facet joint      4. Anxiety      5. Other glaucoma of both eyes      6.  Breast cancer screening by mammogram    - EL DIGITAL SCREEN W OR WO CAD BILATERAL; Future    7. Osteoarthritis of both hips, unspecified osteoarthritis type        Return in about 6 weeks (around 10/3/2022) for follow up in office. An  electronic signature was used to authenticate this note. --Mouna Jolley PA-C on 8/22/2022 at 2:43 PM        Pursuant to the emergency declaration under the [address] [place] [address], [facility] waiver authority and the [employer] Act, this Virtual  Visit was conducted, with patient's consent, to reduce the patient's risk of exposure to COVID-19 and provide continuity of care for an established patient. Services were provided through a video synchronous discussion virtually to substitute for in-person clinic visit.

## 2022-08-22 NOTE — PATIENT INSTRUCTIONS
Personalized Preventive Plan for Skye Lopez - 8/22/2022  Medicare offers a range of preventive health benefits. Some of the tests and screenings are paid in full while other may be subject to a deductible, co-insurance, and/or copay. Some of these benefits include a comprehensive review of your medical history including lifestyle, illnesses that may run in your family, and various assessments and screenings as appropriate. After reviewing your medical record and screening and assessments performed today your provider may have ordered immunizations, labs, imaging, and/or referrals for you. A list of these orders (if applicable) as well as your Preventive Care list are included within your After Visit Summary for your review. Other Preventive Recommendations:    A preventive eye exam performed by an eye specialist is recommended every 1-2 years to screen for glaucoma; cataracts, macular degeneration, and other eye disorders. A preventive dental visit is recommended every 6 months. Try to get at least 150 minutes of exercise per week or 10,000 steps per day on a pedometer . Order or download the FREE \"Exercise & Physical Activity: Your Everyday Guide\" from [employer]. Call 0-726.464.8262 or search [employer] online. You need 5656-6257 mg of calcium and 5353-1656 IU of vitamin D per day. It is possible to meet your calcium requirement with diet alone, but a vitamin D supplement is usually necessary to meet this goal.  When exposed to the sun, use a sunscreen that protects against both UVA and UVB radiation with an SPF of 30 or greater. Reapply every 2 to 3 hours or after sweating, drying off with a towel, or swimming. Always wear a seat belt when traveling in a car. Always wear a helmet when riding a bicycle or motorcycle.

## 2022-08-22 NOTE — PROGRESS NOTES
Medicare Annual Wellness Visit    Martha Garcia is here for Medicare AWV    Assessment & Plan   Medicare annual wellness visit, subsequent    Recommendations for Preventive Services Due: see orders and patient instructions/AVS.  Recommended screening schedule for the next 5-10 years is provided to the patient in written form: see Patient Instructions/AVS.     Return for Medicare Annual Wellness Visit in 1 year. Subjective     Patient's complete Health Risk Assessment and screening values have been reviewed and are found in Flowsheets. The following problems were reviewed today and where indicated follow up appointments were made and/or referrals ordered.     Positive Risk Factor Screenings with Interventions:             General Health and ACP:  General  In general, how would you say your health is?: Good  In the past 7 days, have you experienced any of the following: New or Increased Pain, New or Increased Fatigue, Loneliness, Social Isolation, Stress or Anger?: No  Do you get the social and emotional support that you need?: Yes  Do you have a Living Will?: (!) No    Advance Directives       Power of  Living Will ACP-Advance Directive ACP-Power of     Not on File Not on File Not on File Not on File          General Health Risk Interventions:  No Living Will: Patient declines ACP discussion/assistance    Health Habits/Nutrition:  Physical Activity: Inactive    Days of Exercise per Week: 0 days    Minutes of Exercise per Session: 0 min     Have you lost any weight without trying in the past 3 months?: No     Have you seen the dentist within the past year?: Yes  Health Habits/Nutrition Interventions:  Inadequate physical activity:  going to PT    Hearing/Vision:  Do you or your family notice any trouble with your hearing that hasn't been managed with hearing aids?: (!) Yes  Do you have difficulty driving, watching TV, or doing any of your daily activities because of your eyesight?: (!) Yes  Have you had an eye exam within the past year?: Yes  No results found. Hearing/Vision Interventions:  Hearing concerns:  patient declines any further evaluation/treatment for hearing issues  Vision concerns:  followed by specialist    Safety:  Do you have working smoke detectors?: Yes  Do you have any tripping hazards - loose or unsecured carpets or rugs?: No  Do you have any tripping hazards - clutter in doorways, halls, or stairs?: No  Do you have either shower bars, grab bars, non-slip mats or non-slip surfaces in your shower or bathtub?: (!) No  Do all of your stairways have a railing or banister?: Not Applicable  Do you always fasten your seatbelt when you are in a car?: Yes  Safety Interventions:  Has grab bars/safety at home           Objective      Patient-Reported Vitals  No data recorded          Allergies   Allergen Reactions    Ibuprofen Other (See Comments)    Midazolam Hcl      Other reaction(s): Other: See Comments  Makes her feel more anxious    Nsaids Other (See Comments)    Sulfa Antibiotics Other (See Comments)     elevated blood pressure    Sulfasalazine Other (See Comments)     elevated blood pressure     Prior to Visit Medications    Medication Sig Taking?  Authorizing Provider   pregabalin (LYRICA) 50 MG capsule  Yes Historical Provider, MD   naloxone 4 MG/0.1ML LIQD nasal spray If suspect overdose/unresponsiveness, 1 spray into nostril, may repeat 2-3 min alternating nostril until response achieved or EMS arrives Yes Historical Provider, MD   metoprolol succinate (TOPROL XL) 100 MG extended release tablet TAKE 1 TABLET BY MOUTH ONCE A DAY Yes Norma Espinosa PA-C   lisinopril (PRINIVIL;ZESTRIL) 20 MG tablet TAKE 1 TABLET BY MOUTH ONCE A DAY Yes Norma Espinosa PA-C   lamoTRIgine (LAMICTAL) 25 MG tablet  Yes Historical Provider, MD   LORazepam (ATIVAN) 2 MG tablet  Yes Historical Provider, MD   vitamin D (ERGOCALCIFEROL) 1.25 MG (62327 UT) CAPS capsule TAKE 1 CAPSULE BY MOUTH 1 TIME A WEEK Yes Norma ORTIZ MADELAINE Espinosa   allopurinol (ZYLOPRIM) 100 MG tablet TAKE 1 TABLET BY MOUTH DAILY Yes Norma Espinosa PA-C   amLODIPine (NORVASC) 5 MG tablet Take 1 tablet by mouth daily for 7 days Yes Norma Espinosa PA-C   latanoprost (XALATAN) 0.005 % ophthalmic solution INSTILL 1 DROP IN BOTH EYES EVERY EVENING Yes Historical Provider, MD   fexofenadine (ALLEGRA) 180 MG tablet Take 1 tablet by mouth daily Yes Norma Espinosa PA-C   fluticasone (FLONASE) 50 MCG/ACT nasal spray SHAKE LIQUID AND USE 1 SPRAY IN EACH NOSTRIL DAILY Yes Norma Espinosa PA-C   pregabalin (LYRICA) 100 MG capsule Take 100 mg by mouth 2 times daily. Yes Historical Provider, MD   traMADol (ULTRAM) 50 MG tablet 1-2 tablet by mouth 3 times a day  for pain prn Yes MD Susie ReeceTe (Including outside providers/suppliers regularly involved in providing care):   Patient Care Team:  John Flores PA-C as PCP - General (Family Medicine)  John Flores PA-C as PCP - [facility]  [facility] Empaneled Provider     Reviewed and updated this visit:  Allergies  Meds  Problems           Chris Pringel, was evaluated through a synchronous (real-time) audio-video encounter. The patient (or guardian if applicable) is aware that this is a billable service, which includes applicable co-pays. This Virtual Visit was conducted with patient's (and/or legal guardian's) consent. The visit was conducted pursuant to the emergency declaration under the [address] waiver authority and the [employer] and [employer] Act. Patient identification was verified, and a caregiver was present when appropriate. The patient was located at [facility] ([address] [facility]): [address] [facility]  [address] [facility],  [id number] [place] [address]. Provider was located at Home ([address]): [place].

## 2022-09-22 ENCOUNTER — HOSPITAL ENCOUNTER (OUTPATIENT)
Dept: WOMENS IMAGING | Age: 67
Discharge: HOME | End: 2022-09-24
Payer: MEDICARE

## 2022-09-22 DIAGNOSIS — Z12.31 BREAST CANCER SCREENING BY MAMMOGRAM: ICD-10-CM

## 2022-09-22 PROCEDURE — 77063 BREAST TOMOSYNTHESIS BI: CPT

## 2022-09-25 ENCOUNTER — TELEPHONE (OUTPATIENT)
Dept: FAMILY MEDICINE CLINIC | Age: 67
End: 2022-09-25

## 2022-09-25 DIAGNOSIS — R92.8 ABNORMAL MAMMOGRAM OF RIGHT BREAST: Primary | ICD-10-CM

## 2022-09-26 NOTE — TELEPHONE ENCOUNTER
----- Message from Lulu Roach sent at 9/24/2022  9:12 AM EDT -----  Regarding: Order Needed  Patient is being called back for additional imaging.     We need an order for a Right Diagnostic Mammogram with US/PRN  Diagnosis R92.8 (Abn. Mamm.)    Thank you

## 2022-10-06 ENCOUNTER — APPOINTMENT (OUTPATIENT)
Dept: ULTRASOUND IMAGING | Age: 67
End: 2022-10-06
Payer: MEDICARE

## 2022-10-06 ENCOUNTER — HOSPITAL ENCOUNTER (OUTPATIENT)
Dept: WOMENS IMAGING | Age: 67
Discharge: HOME | End: 2022-10-08
Payer: MEDICARE

## 2022-10-06 DIAGNOSIS — R92.8 ABNORMAL MAMMOGRAM OF RIGHT BREAST: ICD-10-CM

## 2022-10-06 PROCEDURE — G0279 TOMOSYNTHESIS, MAMMO: HCPCS

## 2022-10-19 DIAGNOSIS — N18.31 STAGE 3A CHRONIC KIDNEY DISEASE (HCC): ICD-10-CM

## 2022-10-19 DIAGNOSIS — I10 BENIGN ESSENTIAL HTN: ICD-10-CM

## 2022-10-19 RX ORDER — LISINOPRIL 20 MG/1
TABLET ORAL
Qty: 90 TABLET | Refills: 1 | Status: SHIPPED | OUTPATIENT
Start: 2022-10-19 | End: 2022-12-19

## 2022-10-19 NOTE — TELEPHONE ENCOUNTER
Pharmacy is  requesting medication refill.  Please approve or deny this request.    Rx requested:  Requested Prescriptions     Pending Prescriptions Disp Refills    lisinopril (PRINIVIL;ZESTRIL) 20 MG tablet [Pharmacy Med Name: LISINOPRIL 20 MG TABLET 20 Tablet] 90 tablet 1     Sig: TAKE 1 TABLET BY MOUTH ONCE A DAY         Last Office Visit:   8/22/2022      Next Visit Date:  Future Appointments   Date Time Provider Joseph Roblero   11/29/2022 11:00 AM MADELAINE Leiva [facility]

## 2022-12-13 DIAGNOSIS — N18.31 STAGE 3A CHRONIC KIDNEY DISEASE (HCC): ICD-10-CM

## 2022-12-13 RX ORDER — AMLODIPINE BESYLATE 5 MG/1
5 TABLET ORAL DAILY
Qty: 90 TABLET | Refills: 4 | Status: SHIPPED | OUTPATIENT
Start: 2022-12-13 | End: 2022-12-20

## 2022-12-13 NOTE — TELEPHONE ENCOUNTER
Comments: This request is coming from the pharmacy. Last Office Visit (last PCP visit):   8/22/2022    Next Visit Date:  No future appointments. If hasn't been seen in over a year OR hasn't followed up according to last diabetes/ADHD visit, make appointment for patient before sending refill to provider.     Rx requested:  Requested Prescriptions     Pending Prescriptions Disp Refills    amLODIPine (NORVASC) 5 MG tablet [Pharmacy Med Name: AMLODIPINE BESYLATE 5 MG TA 5 Tablet] 90 tablet 4     Sig: TAKE 1 TABLET BY MOUTH DAILY FOR 7 DAYS

## 2022-12-17 DIAGNOSIS — N18.31 STAGE 3A CHRONIC KIDNEY DISEASE (HCC): ICD-10-CM

## 2022-12-17 DIAGNOSIS — I10 BENIGN ESSENTIAL HTN: ICD-10-CM

## 2022-12-19 RX ORDER — LISINOPRIL 20 MG/1
TABLET ORAL
Qty: 90 TABLET | Refills: 1 | Status: SHIPPED | OUTPATIENT
Start: 2022-12-19

## 2022-12-24 DIAGNOSIS — N18.31 STAGE 3A CHRONIC KIDNEY DISEASE (HCC): ICD-10-CM

## 2022-12-24 DIAGNOSIS — I10 BENIGN ESSENTIAL HTN: ICD-10-CM

## 2022-12-28 RX ORDER — LISINOPRIL 20 MG/1
TABLET ORAL
Qty: 90 TABLET | Refills: 1 | Status: SHIPPED | OUTPATIENT
Start: 2022-12-28

## 2023-02-01 DIAGNOSIS — Z12.31 OTHER SCREENING MAMMOGRAM: ICD-10-CM

## 2023-02-03 ENCOUNTER — PATIENT MESSAGE (OUTPATIENT)
Dept: ADMINISTRATIVE | Facility: HOSPITAL | Age: 68
End: 2023-02-03

## 2023-02-09 ENCOUNTER — PATIENT OUTREACH (OUTPATIENT)
Dept: ADMINISTRATIVE | Facility: HOSPITAL | Age: 68
End: 2023-02-09

## 2023-02-24 ENCOUNTER — PATIENT OUTREACH (OUTPATIENT)
Dept: ADMINISTRATIVE | Facility: HOSPITAL | Age: 68
End: 2023-02-24

## 2023-03-08 ENCOUNTER — PATIENT OUTREACH (OUTPATIENT)
Dept: ADMINISTRATIVE | Facility: HOSPITAL | Age: 68
End: 2023-03-08

## 2023-03-31 ENCOUNTER — PATIENT OUTREACH (OUTPATIENT)
Dept: ADMINISTRATIVE | Facility: HOSPITAL | Age: 68
End: 2023-03-31

## 2023-04-24 ENCOUNTER — PATIENT OUTREACH (OUTPATIENT)
Dept: ADMINISTRATIVE | Facility: HOSPITAL | Age: 68
End: 2023-04-24

## 2023-04-24 DIAGNOSIS — E11.9 TYPE 2 DIABETES MELLITUS WITHOUT COMPLICATION, WITHOUT LONG-TERM CURRENT USE OF INSULIN: Primary | ICD-10-CM

## 2023-04-24 DIAGNOSIS — Z13.9 DUE FOR SCREENING: ICD-10-CM

## 2023-06-27 DIAGNOSIS — N18.31 STAGE 3A CHRONIC KIDNEY DISEASE (HCC): ICD-10-CM

## 2023-06-27 DIAGNOSIS — I10 BENIGN ESSENTIAL HTN: ICD-10-CM

## 2023-06-27 RX ORDER — LISINOPRIL 20 MG/1
TABLET ORAL
Qty: 90 TABLET | Refills: 1 | Status: SHIPPED | OUTPATIENT
Start: 2023-06-27

## 2023-06-29 ENCOUNTER — OFFICE VISIT (OUTPATIENT)
Dept: FAMILY MEDICINE CLINIC | Age: 68
End: 2023-06-29
Payer: MEDICARE

## 2023-06-29 VITALS
OXYGEN SATURATION: 96 % | RESPIRATION RATE: 16 BRPM | HEART RATE: 72 BPM | SYSTOLIC BLOOD PRESSURE: 122 MMHG | BODY MASS INDEX: 35.46 KG/M2 | HEIGHT: 68 IN | TEMPERATURE: 96.9 F | DIASTOLIC BLOOD PRESSURE: 78 MMHG | WEIGHT: 234 LBS

## 2023-06-29 DIAGNOSIS — I10 BENIGN ESSENTIAL HTN: ICD-10-CM

## 2023-06-29 DIAGNOSIS — R73.03 PREDIABETES: ICD-10-CM

## 2023-06-29 DIAGNOSIS — M48.062 SPINAL STENOSIS OF LUMBAR REGION WITH NEUROGENIC CLAUDICATION: ICD-10-CM

## 2023-06-29 DIAGNOSIS — J30.2 SEASONAL ALLERGIES: ICD-10-CM

## 2023-06-29 DIAGNOSIS — E79.0 HYPERURICEMIA: ICD-10-CM

## 2023-06-29 DIAGNOSIS — M62.838 MUSCLE SPASM: ICD-10-CM

## 2023-06-29 DIAGNOSIS — E55.9 VITAMIN D DEFICIENCY: ICD-10-CM

## 2023-06-29 DIAGNOSIS — N18.31 STAGE 3A CHRONIC KIDNEY DISEASE (HCC): Primary | ICD-10-CM

## 2023-06-29 DIAGNOSIS — Z12.31 BREAST CANCER SCREENING BY MAMMOGRAM: ICD-10-CM

## 2023-06-29 PROCEDURE — 99214 OFFICE O/P EST MOD 30 MIN: CPT | Performed by: PHYSICIAN ASSISTANT

## 2023-06-29 PROCEDURE — 3078F DIAST BP <80 MM HG: CPT | Performed by: PHYSICIAN ASSISTANT

## 2023-06-29 PROCEDURE — 1123F ACP DISCUSS/DSCN MKR DOCD: CPT | Performed by: PHYSICIAN ASSISTANT

## 2023-06-29 PROCEDURE — 3074F SYST BP LT 130 MM HG: CPT | Performed by: PHYSICIAN ASSISTANT

## 2023-06-29 RX ORDER — CYCLOBENZAPRINE HCL 5 MG
5 TABLET ORAL NIGHTLY PRN
Qty: 30 TABLET | Refills: 2 | Status: SHIPPED | OUTPATIENT
Start: 2023-06-29 | End: 2023-09-27

## 2023-06-29 RX ORDER — FEXOFENADINE HCL 180 MG/1
180 TABLET ORAL DAILY
Qty: 90 TABLET | Refills: 1 | Status: SHIPPED | OUTPATIENT
Start: 2023-06-29

## 2023-06-29 RX ORDER — ALLOPURINOL 100 MG/1
100 TABLET ORAL DAILY
Qty: 90 TABLET | Refills: 1 | Status: CANCELLED | OUTPATIENT
Start: 2023-06-29

## 2023-06-29 RX ORDER — FLUTICASONE PROPIONATE 50 MCG
SPRAY, SUSPENSION (ML) NASAL
Qty: 48 G | Refills: 3 | Status: SHIPPED | OUTPATIENT
Start: 2023-06-29

## 2023-06-29 RX ORDER — ERGOCALCIFEROL 1.25 MG/1
CAPSULE ORAL
Qty: 12 CAPSULE | Refills: 4 | Status: SHIPPED | OUTPATIENT
Start: 2023-06-29

## 2023-06-29 SDOH — ECONOMIC STABILITY: INCOME INSECURITY: HOW HARD IS IT FOR YOU TO PAY FOR THE VERY BASICS LIKE FOOD, HOUSING, MEDICAL CARE, AND HEATING?: NOT HARD AT ALL

## 2023-06-29 SDOH — ECONOMIC STABILITY: HOUSING INSECURITY
IN THE LAST 12 MONTHS, WAS THERE A TIME WHEN YOU DID NOT HAVE A STEADY PLACE TO SLEEP OR SLEPT IN A SHELTER (INCLUDING NOW)?: NO

## 2023-06-29 SDOH — ECONOMIC STABILITY: FOOD INSECURITY: WITHIN THE PAST 12 MONTHS, YOU WORRIED THAT YOUR FOOD WOULD RUN OUT BEFORE YOU GOT MONEY TO BUY MORE.: NEVER TRUE

## 2023-06-29 SDOH — ECONOMIC STABILITY: FOOD INSECURITY: WITHIN THE PAST 12 MONTHS, THE FOOD YOU BOUGHT JUST DIDN'T LAST AND YOU DIDN'T HAVE MONEY TO GET MORE.: NEVER TRUE

## 2023-06-29 ASSESSMENT — ENCOUNTER SYMPTOMS
SINUS PAIN: 0
CHEST TIGHTNESS: 0
SHORTNESS OF BREATH: 0
COLOR CHANGE: 0
BLOOD IN STOOL: 0
SINUS PRESSURE: 0
BACK PAIN: 1
ABDOMINAL PAIN: 0
ABDOMINAL DISTENTION: 0
DIARRHEA: 0
COUGH: 0
RECTAL PAIN: 0
NAUSEA: 0

## 2023-06-29 ASSESSMENT — PATIENT HEALTH QUESTIONNAIRE - PHQ9
1. LITTLE INTEREST OR PLEASURE IN DOING THINGS: 0
SUM OF ALL RESPONSES TO PHQ QUESTIONS 1-9: 0
4. FEELING TIRED OR HAVING LITTLE ENERGY: 0
8. MOVING OR SPEAKING SO SLOWLY THAT OTHER PEOPLE COULD HAVE NOTICED. OR THE OPPOSITE, BEING SO FIGETY OR RESTLESS THAT YOU HAVE BEEN MOVING AROUND A LOT MORE THAN USUAL: 0
SUM OF ALL RESPONSES TO PHQ QUESTIONS 1-9: 0
2. FEELING DOWN, DEPRESSED OR HOPELESS: 0
3. TROUBLE FALLING OR STAYING ASLEEP: 0
9. THOUGHTS THAT YOU WOULD BE BETTER OFF DEAD, OR OF HURTING YOURSELF: 0
5. POOR APPETITE OR OVEREATING: 0
6. FEELING BAD ABOUT YOURSELF - OR THAT YOU ARE A FAILURE OR HAVE LET YOURSELF OR YOUR FAMILY DOWN: 0
10. IF YOU CHECKED OFF ANY PROBLEMS, HOW DIFFICULT HAVE THESE PROBLEMS MADE IT FOR YOU TO DO YOUR WORK, TAKE CARE OF THINGS AT HOME, OR GET ALONG WITH OTHER PEOPLE: 0
7. TROUBLE CONCENTRATING ON THINGS, SUCH AS READING THE NEWSPAPER OR WATCHING TELEVISION: 0
SUM OF ALL RESPONSES TO PHQ9 QUESTIONS 1 & 2: 0
SUM OF ALL RESPONSES TO PHQ QUESTIONS 1-9: 0
SUM OF ALL RESPONSES TO PHQ QUESTIONS 1-9: 0

## 2023-07-11 DIAGNOSIS — E55.9 VITAMIN D DEFICIENCY: ICD-10-CM

## 2023-07-11 DIAGNOSIS — N18.31 STAGE 3A CHRONIC KIDNEY DISEASE (HCC): ICD-10-CM

## 2023-07-11 DIAGNOSIS — R73.03 PREDIABETES: ICD-10-CM

## 2023-07-11 DIAGNOSIS — I10 BENIGN ESSENTIAL HTN: ICD-10-CM

## 2023-07-11 DIAGNOSIS — E79.0 HYPERURICEMIA: ICD-10-CM

## 2023-07-11 LAB
ALBUMIN SERPL-MCNC: 4.1 G/DL (ref 3.5–4.6)
ALP SERPL-CCNC: 94 U/L (ref 40–130)
ALT SERPL-CCNC: 23 U/L (ref 0–33)
AST SERPL-CCNC: 19 U/L (ref 0–35)
BILIRUB DIRECT SERPL-MCNC: <0.2 MG/DL (ref 0–0.4)
BILIRUB INDIRECT SERPL-MCNC: ABNORMAL MG/DL (ref 0–0.6)
BILIRUB SERPL-MCNC: 0.8 MG/DL (ref 0.2–0.7)
ERYTHROCYTE [DISTWIDTH] IN BLOOD BY AUTOMATED COUNT: 14 % (ref 11.5–14.5)
HBA1C MFR BLD: 6.3 % (ref 4.8–5.9)
HCT VFR BLD AUTO: 39 % (ref 37–47)
HGB BLD-MCNC: 12.8 G/DL (ref 12–16)
MCH RBC QN AUTO: 27.8 PG (ref 27–31.3)
MCHC RBC AUTO-ENTMCNC: 32.7 % (ref 33–37)
MCV RBC AUTO: 85.1 FL (ref 79.4–94.8)
PLATELET # BLD AUTO: 241 K/UL (ref 130–400)
PROT SERPL-MCNC: 7.3 G/DL (ref 6.3–8)
RBC # BLD AUTO: 4.58 M/UL (ref 4.2–5.4)
URATE SERPL-MCNC: 7 MG/DL (ref 2.4–5.7)
VITAMIN D 25-HYDROXY: 40 NG/ML
WBC # BLD AUTO: 4.4 K/UL (ref 4.8–10.8)

## 2023-07-13 RX ORDER — ALLOPURINOL 300 MG/1
300 TABLET ORAL DAILY
Qty: 90 TABLET | Refills: 1 | Status: SHIPPED | OUTPATIENT
Start: 2023-07-13

## 2023-07-15 DIAGNOSIS — I10 BENIGN ESSENTIAL HTN: ICD-10-CM

## 2023-07-15 DIAGNOSIS — N18.31 STAGE 3A CHRONIC KIDNEY DISEASE (HCC): ICD-10-CM

## 2023-07-19 RX ORDER — LISINOPRIL 20 MG/1
TABLET ORAL
Qty: 90 TABLET | Refills: 1 | OUTPATIENT
Start: 2023-07-19

## 2023-07-29 DIAGNOSIS — Z76.0 MEDICATION REFILL: ICD-10-CM

## 2023-07-29 DIAGNOSIS — I10 BENIGN ESSENTIAL HTN: ICD-10-CM

## 2023-07-31 ENCOUNTER — TELEPHONE (OUTPATIENT)
Dept: FAMILY MEDICINE CLINIC | Age: 68
End: 2023-07-31

## 2023-07-31 NOTE — TELEPHONE ENCOUNTER
Patient states she would like another order for her labs placed so she can see if it is really  necessary for her to take the [address].     Rahel Chino [phone]

## 2023-08-01 RX ORDER — METOPROLOL SUCCINATE 100 MG/1
TABLET, EXTENDED RELEASE ORAL
Qty: 90 TABLET | Refills: 4 | Status: SHIPPED | OUTPATIENT
Start: 2023-08-01

## 2023-08-01 NOTE — TELEPHONE ENCOUNTER
Comments: This request is coming from the pharmacy. Last Office Visit (last PCP visit):   6/29/2023    Next Visit Date:  Future Appointments   Date Time Provider [address]  [facility]   10/4/2023  3:00 PM MADELAINE [place] [address]       If hasn't been seen in over a year OR hasn't followed up according to last diabetes/ADHD visit, make appointment for patient before sending refill to provider.     Rx requested:  Requested Prescriptions     Pending Prescriptions Disp Refills    metoprolol succinate (TOPROL XL) 100 MG extended release tablet [Pharmacy Med Name: METOPROLOL SUCC  MG T 100 MG Tablet] 90 tablet 4     Sig: TAKE 1 TABLET BY MOUTH ONCE A DAY

## 2023-08-03 NOTE — TELEPHONE ENCOUNTER
Spoke with pt and advised her this  She stated she's been on the medication for 4 days and yesterday she thinks her sugar was too low because of the medication because she felt shaky and sick and drank orange juice which helped   States she has cut out a lot of foods like oreos that she was eating states she was eating a whole pack of oreos at night

## 2023-09-07 DIAGNOSIS — M62.838 MUSCLE SPASM: ICD-10-CM

## 2023-09-07 RX ORDER — CYCLOBENZAPRINE HCL 5 MG
5 TABLET ORAL NIGHTLY PRN
Qty: 30 TABLET | Refills: 2 | Status: SHIPPED | OUTPATIENT
Start: 2023-09-07 | End: 2023-12-06

## 2023-09-15 ENCOUNTER — PATIENT OUTREACH (OUTPATIENT)
Dept: ADMINISTRATIVE | Facility: HOSPITAL | Age: 68
End: 2023-09-15

## 2023-09-25 ENCOUNTER — PATIENT OUTREACH (OUTPATIENT)
Dept: ADMINISTRATIVE | Facility: HOSPITAL | Age: 68
End: 2023-09-25

## 2023-09-25 ENCOUNTER — PATIENT MESSAGE (OUTPATIENT)
Dept: ADMINISTRATIVE | Facility: HOSPITAL | Age: 68
End: 2023-09-25

## 2023-09-27 ENCOUNTER — TELEPHONE (OUTPATIENT)
Dept: FAMILY MEDICINE CLINIC | Age: 68
End: 2023-09-27

## 2023-09-27 NOTE — TELEPHONE ENCOUNTER
Patient states she still has a headache and pressure, sore throat and itching in the vaginal area, patient is concerned that she might have a yeast infection. Patient states that she did not have any of these symptoms before starting Jardiance. Patient states she is taking 1/2 a tablet once a day. Please Advise.     # [phone]

## 2023-09-28 NOTE — TELEPHONE ENCOUNTER
Spoke with patient, she states that she would like to wait until her appointment with you on 10/4/2023 to be assessed for UTI.

## 2023-09-30 DIAGNOSIS — M62.838 MUSCLE SPASM: ICD-10-CM

## 2023-10-03 RX ORDER — CYCLOBENZAPRINE HCL 5 MG
5 TABLET ORAL NIGHTLY PRN
Qty: 30 TABLET | Refills: 2 | OUTPATIENT
Start: 2023-10-03 | End: 2024-01-01

## 2023-10-04 ENCOUNTER — OFFICE VISIT (OUTPATIENT)
Dept: FAMILY MEDICINE CLINIC | Age: 68
End: 2023-10-04
Payer: MEDICARE

## 2023-10-04 VITALS
BODY MASS INDEX: 34.71 KG/M2 | WEIGHT: 229 LBS | HEART RATE: 73 BPM | OXYGEN SATURATION: 96 % | SYSTOLIC BLOOD PRESSURE: 130 MMHG | RESPIRATION RATE: 16 BRPM | HEIGHT: 68 IN | DIASTOLIC BLOOD PRESSURE: 82 MMHG

## 2023-10-04 DIAGNOSIS — N18.31 STAGE 3A CHRONIC KIDNEY DISEASE (HCC): ICD-10-CM

## 2023-10-04 DIAGNOSIS — R73.03 PREDIABETES: ICD-10-CM

## 2023-10-04 DIAGNOSIS — E79.0 HYPERURICEMIA: ICD-10-CM

## 2023-10-04 DIAGNOSIS — Z00.00 MEDICARE ANNUAL WELLNESS VISIT, SUBSEQUENT: Primary | ICD-10-CM

## 2023-10-04 DIAGNOSIS — N89.8 VAGINAL ITCHING: ICD-10-CM

## 2023-10-04 LAB
HBA1C MFR BLD: 6 % (ref 4.8–5.9)
URATE SERPL-MCNC: 7.1 MG/DL (ref 2.4–5.7)

## 2023-10-04 PROCEDURE — 1123F ACP DISCUSS/DSCN MKR DOCD: CPT | Performed by: PHYSICIAN ASSISTANT

## 2023-10-04 PROCEDURE — G0439 PPPS, SUBSEQ VISIT: HCPCS | Performed by: PHYSICIAN ASSISTANT

## 2023-10-04 PROCEDURE — 3079F DIAST BP 80-89 MM HG: CPT | Performed by: PHYSICIAN ASSISTANT

## 2023-10-04 PROCEDURE — 3017F COLORECTAL CA SCREEN DOC REV: CPT | Performed by: PHYSICIAN ASSISTANT

## 2023-10-04 PROCEDURE — 3075F SYST BP GE 130 - 139MM HG: CPT | Performed by: PHYSICIAN ASSISTANT

## 2023-10-04 PROCEDURE — G8484 FLU IMMUNIZE NO ADMIN: HCPCS | Performed by: PHYSICIAN ASSISTANT

## 2023-10-04 RX ORDER — FLUCONAZOLE 150 MG/1
TABLET ORAL
Qty: 2 TABLET | Refills: 0 | Status: SHIPPED | OUTPATIENT
Start: 2023-10-04 | End: 2023-10-06 | Stop reason: SDUPTHER

## 2023-10-04 ASSESSMENT — PATIENT HEALTH QUESTIONNAIRE - PHQ9
SUM OF ALL RESPONSES TO PHQ QUESTIONS 1-9: 0
2. FEELING DOWN, DEPRESSED OR HOPELESS: 0
SUM OF ALL RESPONSES TO PHQ QUESTIONS 1-9: 0
1. LITTLE INTEREST OR PLEASURE IN DOING THINGS: 0
SUM OF ALL RESPONSES TO PHQ9 QUESTIONS 1 & 2: 0
SUM OF ALL RESPONSES TO PHQ QUESTIONS 1-9: 0
SUM OF ALL RESPONSES TO PHQ QUESTIONS 1-9: 0

## 2023-10-04 ASSESSMENT — LIFESTYLE VARIABLES
HOW OFTEN DO YOU HAVE A DRINK CONTAINING ALCOHOL: NEVER
HOW MANY STANDARD DRINKS CONTAINING ALCOHOL DO YOU HAVE ON A TYPICAL DAY: PATIENT DOES NOT DRINK

## 2023-10-04 NOTE — PROGRESS NOTES
Medicare Annual Wellness Visit    Reggie Santiago is here for Medicare AWV    Assessment & Plan   Medicare annual wellness visit, subsequent  Hyperuricemia  -     Uric Acid; Future  Stage 3a chronic kidney disease (HCC)  Prediabetes  -     Hemoglobin A1C; Future  Vaginal itching  -     Culture, Urine; Future  -     fluconazole (DIFLUCAN) 150 MG tablet; 1 PO STAT; repeat 1 PO in 72 hours. , Disp-2 tablet, R-0Normal  Recommendations for Preventive Services Due: see orders and patient instructions/AVS.  Recommended screening schedule for the next 5-10 years is provided to the patient in written form: see Patient Instructions/AVS.     No follow-ups on file. Subjective   The following acute and/or chronic problems were also addressed today:  Would like to discuss uric acid and blood sugar. Has been taking 5 mg jardiance daily. Feels like she is having vaginal itching and mild dysuria. Has been trying to monitor sugar intake. Has lost about 5 lbs. Taking allopurinol every other day. Chronic back pain. [facility]. Wasn't contacted to see Dr. Mat Altman. Known lumbar stenosis. Has received injections in the past.  Initially had good relief, has since had only 1-2 days of relief after injections. Back pain is now affecting her activity level, ability to move. Patient's complete Health Risk Assessment and screening values have been reviewed and are found in Flowsheets. The following problems were reviewed today and where indicated follow up appointments were made and/or referrals ordered. Positive Risk Factor Screenings with Interventions:                Opioid Risk: (Low risk score <55) Opioid risk score: 14    Patient is low risk for opioid use disorder or overdose.   Last PDMP Timur Casillas as Reviewed:  Review User Review Instant Review Result   Jason Owusu 6/29/2023 12:11 PM     Reviewed PDMP [1]     Last Controlled Substance Monitoring Documentation      [facility] Office Visit from

## 2023-10-04 NOTE — PATIENT INSTRUCTIONS
Learning About Vision Tests  What are vision tests? The four most common vision tests are visual acuity tests, refraction, visual field tests, and color vision tests. Visual acuity (sharpness) tests  These tests are used: To see if you need glasses or contact lenses. To monitor an eye problem. To check an eye injury. Visual acuity tests are done as part of routine exams. You may also have this test when you get your 's license or apply for some types of jobs. Visual field tests  These tests are used: To check for vision loss in any area of your range of vision. To screen for certain eye diseases. To look for nerve damage after a stroke, head injury, or other problem that could reduce blood flow to the brain. Refraction and color tests  A refraction test is done to find the right prescription for glasses and contact lenses. A color vision test is done to check for color blindness. Color vision is often tested as part of a routine exam. You may also have this test when you apply for a job where recognizing different colors is important, such as , electronics, or the [employer]. How are vision tests done? Visual acuity test   You cover one eye at a time. You read aloud from a wall chart across the room. You read aloud from a small card that you hold in your hand. Refraction   You look into a special device. The device puts lenses of different strengths in front of each eye to see how strong your glasses or contact lenses need to be. Visual field tests   Your doctor may have you look through special machines. Or your doctor may simply have you stare straight ahead while they move a finger into and out of your field of vision. Color vision test   You look at pieces of printed test patterns in various colors. You say what number or symbol you see. Your doctor may have you trace the number or symbol using a pointer. How do these tests feel?   There is very little chance of

## 2023-10-06 LAB
BACTERIA UR CULT: ABNORMAL
BACTERIA UR CULT: ABNORMAL
ORGANISM: ABNORMAL

## 2023-10-06 RX ORDER — FLUCONAZOLE 150 MG/1
TABLET ORAL
Qty: 2 TABLET | Refills: 3 | Status: SHIPPED | OUTPATIENT
Start: 2023-10-06

## 2023-10-08 RX ORDER — NITROFURANTOIN 25; 75 MG/1; MG/1
100 CAPSULE ORAL 2 TIMES DAILY
Qty: 20 CAPSULE | Refills: 0 | Status: SHIPPED | OUTPATIENT
Start: 2023-10-08 | End: 2023-10-18

## 2023-10-09 RX ORDER — NITROFURANTOIN 25; 75 MG/1; MG/1
100 CAPSULE ORAL 2 TIMES DAILY
Qty: 20 CAPSULE | Refills: 0 | OUTPATIENT
Start: 2023-10-09 | End: 2023-10-19

## 2023-10-10 RX ORDER — ALLOPURINOL 300 MG/1
300 TABLET ORAL DAILY
Qty: 90 TABLET | Refills: 1 | Status: SHIPPED | OUTPATIENT
Start: 2023-10-10

## 2023-10-10 NOTE — TELEPHONE ENCOUNTER
Comments: How often do you see pt? Last Office Visit (last PCP visit):   10/4/2023    Next Visit Date:  No future appointments. **If hasn't been seen in over a year OR hasn't followed up according to last diabetes/ADHD visit, make appointment for patient before sending refill to provider.     Rx requested:  Requested Prescriptions     Pending Prescriptions Disp Refills    allopurinol (ZYLOPRIM) 300 MG tablet [Pharmacy Med Name: ALLOPURINOL 300MG  Tablet] 90 tablet 1     Sig: TAKE 1 TABLET BY MOUTH DAILY

## 2023-10-24 RX ORDER — EMPAGLIFLOZIN 10 MG/1
10 TABLET, FILM COATED ORAL DAILY
Qty: 90 TABLET | Refills: 1 | Status: SHIPPED | OUTPATIENT
Start: 2023-10-24

## 2023-11-06 ENCOUNTER — TELEPHONE (OUTPATIENT)
Dept: FAMILY MEDICINE CLINIC | Age: 68
End: 2023-11-06

## 2023-11-22 ENCOUNTER — TELEPHONE (OUTPATIENT)
Dept: FAMILY MEDICINE CLINIC | Age: 68
End: 2023-11-22

## 2023-11-22 NOTE — TELEPHONE ENCOUNTER
ME crew at bedside to  patient. Patient placed in body bag with all belongings. Arm band in place on LEFT wrist. Record of Death given to RN supervisor - Jackeline April. Pt called to let us know that the itching has not subsided and she has a massive headache. She thought she was having a stroke because the headache was so bad. She believes this is being caused by the jardiance. She was on yeast infection medicaton but the itching is very severe outside of the vagina.       Please advise pt    [phone]

## 2023-11-25 NOTE — TELEPHONE ENCOUNTER
OK, I would advise that she be evaluated in walk-in clinic or with me. Unsure if RAYA would be from the jardiance. Has she tried any over the counter topical yeast infection medications?

## 2023-11-28 NOTE — TELEPHONE ENCOUNTER
Home sleep study was incomplete.  Will scheduled him for an in lab sleep study.   Pt made aware has not tried any OTC meds for this states she scratched herself raw would like an RX sent in to [facility]

## 2023-11-29 RX ORDER — MICONAZOLE NITRATE 40 MG/G
CREAM VAGINAL
Qty: 25 G | Refills: 2 | Status: SHIPPED | OUTPATIENT
Start: 2023-11-29

## 2023-11-29 RX ORDER — FLUCONAZOLE 150 MG/1
TABLET ORAL
Qty: 2 TABLET | Refills: 0 | Status: SHIPPED | OUTPATIENT
Start: 2023-11-29

## 2023-12-23 DIAGNOSIS — M62.838 MUSCLE SPASM: ICD-10-CM

## 2023-12-27 NOTE — TELEPHONE ENCOUNTER
Comments:     Last Office Visit (last PCP visit):   10/4/2023    Next Visit Date:  Future Appointments   Date Time Provider [address]   1/8/2024 11:00 AM [facility] ROOM 2 [facility]       **If hasn't been seen in over a year OR hasn't followed up according to last diabetes/ADHD visit, make appointment for patient before sending refill to provider.     Rx requested:  Requested Prescriptions     Pending Prescriptions Disp Refills    cyclobenzaprine (FLEXERIL) 5 MG tablet [Pharmacy Med Name: CYCLOBENZAPRINE HCL 5 MG TA 5 Tablet] 30 tablet 2     Sig: TAKE 1 TABLET BY MOUTH NIGHTLY AS NEEDED FOR MUSCLE SPASMS

## 2023-12-29 RX ORDER — CYCLOBENZAPRINE HCL 5 MG
5 TABLET ORAL NIGHTLY PRN
Qty: 30 TABLET | Refills: 2 | Status: SHIPPED | OUTPATIENT
Start: 2023-12-29 | End: 2024-03-28

## 2024-01-08 ENCOUNTER — HOSPITAL ENCOUNTER (OUTPATIENT)
Dept: WOMENS IMAGING | Age: 69
Discharge: HOME | End: 2024-01-10
Payer: COMMERCIAL

## 2024-01-08 DIAGNOSIS — Z12.31 BREAST CANCER SCREENING BY MAMMOGRAM: ICD-10-CM

## 2024-01-08 PROCEDURE — 77063 BREAST TOMOSYNTHESIS BI: CPT

## 2024-01-08 RX ORDER — ALLOPURINOL 300 MG/1
300 TABLET ORAL DAILY
Qty: 90 TABLET | Refills: 1 | Status: SHIPPED | OUTPATIENT
Start: 2024-01-08

## 2024-01-08 NOTE — TELEPHONE ENCOUNTER
Comments:   Pharmacy is requesting early refill. Please advise.     Last Office Visit (last PCP visit):   10/4/2023    Next Visit Date:  Future Appointments   Date Time Provider Department Center   1/16/2024  3:00 PM Kaushik Del Real DO [facility]       **If hasn't been seen in over a year OR hasn't followed up according to last diabetes/ADHD visit, make appointment for patient before sending refill to provider.    Rx requested:  Requested Prescriptions     Pending Prescriptions Disp Refills    allopurinol (ZYLOPRIM) 300 MG tablet [Pharmacy Med Name: ALLOPURINOL 300MG  Tablet] 90 tablet 1     Sig: TAKE 1 TABLET BY MOUTH DAILY

## 2024-01-16 RX ORDER — EMPAGLIFLOZIN 10 MG/1
10 TABLET, FILM COATED ORAL DAILY
Qty: 90 TABLET | Refills: 1 | Status: SHIPPED | OUTPATIENT
Start: 2024-01-16

## 2024-01-16 NOTE — TELEPHONE ENCOUNTER
Comments:     Last Office Visit (last PCP visit):   10/4/2023    Next Visit Date:  No future appointments.    **If hasn't been seen in over a year OR hasn't followed up according to last diabetes/ADHD visit, make appointment for patient before sending refill to provider.    Rx requested:  Requested Prescriptions     Pending Prescriptions Disp Refills    JARDIANCE 10 MG tablet [Pharmacy Med Name: JARDIANCE 10 MG TAB 10 Tablet] 90 tablet 1     Sig: TAKE 1 TABLET BY MOUTH DAILY

## 2024-01-26 RX ORDER — EMPAGLIFLOZIN 10 MG/1
10 TABLET, FILM COATED ORAL DAILY
Qty: 90 TABLET | Refills: 1 | OUTPATIENT
Start: 2024-01-26

## 2024-01-30 ENCOUNTER — OFFICE VISIT (OUTPATIENT)
Dept: FAMILY MEDICINE CLINIC | Age: 69
End: 2024-01-30
Payer: COMMERCIAL

## 2024-01-30 VITALS
HEART RATE: 65 BPM | BODY MASS INDEX: 34.86 KG/M2 | SYSTOLIC BLOOD PRESSURE: 122 MMHG | HEIGHT: 68 IN | OXYGEN SATURATION: 97 % | WEIGHT: 230 LBS | RESPIRATION RATE: 16 BRPM | DIASTOLIC BLOOD PRESSURE: 88 MMHG

## 2024-01-30 DIAGNOSIS — E55.9 VITAMIN D DEFICIENCY: ICD-10-CM

## 2024-01-30 DIAGNOSIS — N18.31 STAGE 3A CHRONIC KIDNEY DISEASE (HCC): Primary | ICD-10-CM

## 2024-01-30 DIAGNOSIS — R73.03 PREDIABETES: ICD-10-CM

## 2024-01-30 LAB — HBA1C MFR BLD: 6.4 %

## 2024-01-30 PROCEDURE — G8417 CALC BMI ABV UP PARAM F/U: HCPCS | Performed by: PHYSICIAN ASSISTANT

## 2024-01-30 PROCEDURE — 1090F PRES/ABSN URINE INCON ASSESS: CPT | Performed by: PHYSICIAN ASSISTANT

## 2024-01-30 PROCEDURE — 1036F TOBACCO NON-USER: CPT | Performed by: PHYSICIAN ASSISTANT

## 2024-01-30 PROCEDURE — G8484 FLU IMMUNIZE NO ADMIN: HCPCS | Performed by: PHYSICIAN ASSISTANT

## 2024-01-30 PROCEDURE — G8427 DOCREV CUR MEDS BY ELIG CLIN: HCPCS | Performed by: PHYSICIAN ASSISTANT

## 2024-01-30 PROCEDURE — 99214 OFFICE O/P EST MOD 30 MIN: CPT | Performed by: PHYSICIAN ASSISTANT

## 2024-01-30 PROCEDURE — 83036 HEMOGLOBIN GLYCOSYLATED A1C: CPT | Performed by: PHYSICIAN ASSISTANT

## 2024-01-30 PROCEDURE — 1123F ACP DISCUSS/DSCN MKR DOCD: CPT | Performed by: PHYSICIAN ASSISTANT

## 2024-01-30 PROCEDURE — 3079F DIAST BP 80-89 MM HG: CPT | Performed by: PHYSICIAN ASSISTANT

## 2024-01-30 PROCEDURE — G8400 PT W/DXA NO RESULTS DOC: HCPCS | Performed by: PHYSICIAN ASSISTANT

## 2024-01-30 PROCEDURE — 3074F SYST BP LT 130 MM HG: CPT | Performed by: PHYSICIAN ASSISTANT

## 2024-01-30 PROCEDURE — 3017F COLORECTAL CA SCREEN DOC REV: CPT | Performed by: PHYSICIAN ASSISTANT

## 2024-01-30 RX ORDER — ERGOCALCIFEROL 1.25 MG/1
CAPSULE ORAL
Qty: 12 CAPSULE | Refills: 4 | Status: SHIPPED | OUTPATIENT
Start: 2024-01-30

## 2024-01-30 RX ORDER — AMLODIPINE BESYLATE 5 MG/1
5 TABLET ORAL DAILY
Qty: 90 TABLET | Refills: 4 | Status: SHIPPED | OUTPATIENT
Start: 2024-01-30

## 2024-01-30 RX ORDER — METFORMIN HYDROCHLORIDE 500 MG/1
500 TABLET, EXTENDED RELEASE ORAL
Qty: 90 TABLET | Refills: 1 | Status: SHIPPED | OUTPATIENT
Start: 2024-01-30

## 2024-01-30 ASSESSMENT — PATIENT HEALTH QUESTIONNAIRE - PHQ9
SUM OF ALL RESPONSES TO PHQ QUESTIONS 1-9: 0
SUM OF ALL RESPONSES TO PHQ QUESTIONS 1-9: 0
2. FEELING DOWN, DEPRESSED OR HOPELESS: 0
SUM OF ALL RESPONSES TO PHQ QUESTIONS 1-9: 0
1. LITTLE INTEREST OR PLEASURE IN DOING THINGS: 0
SUM OF ALL RESPONSES TO PHQ9 QUESTIONS 1 & 2: 0
SUM OF ALL RESPONSES TO PHQ QUESTIONS 1-9: 0

## 2024-01-30 ASSESSMENT — ENCOUNTER SYMPTOMS
COUGH: 0
ABDOMINAL PAIN: 0
DIARRHEA: 0
NAUSEA: 0
ABDOMINAL DISTENTION: 0
BLOOD IN STOOL: 0
COLOR CHANGE: 0
BACK PAIN: 1
SHORTNESS OF BREATH: 0
SINUS PAIN: 0
SINUS PRESSURE: 0
CHEST TIGHTNESS: 0
RECTAL PAIN: 0

## 2024-01-30 NOTE — PROGRESS NOTES
esophageal    Cancer Maternal Uncle         lung    Breast Cancer Maternal Cousin     Breast Cancer Maternal Cousin      Allergies   Allergen Reactions    Ibuprofen Other (See Comments)    Midazolam Hcl      Other reaction(s): Other: See Comments  Makes her feel more anxious    Nsaids Other (See Comments)    Sulfa Antibiotics Other (See Comments)     elevated blood pressure    Sulfasalazine Other (See Comments)     elevated blood pressure     Current Outpatient Medications   Medication Sig Dispense Refill    vitamin D (ERGOCALCIFEROL) 1.25 MG (34839 UT) CAPS capsule TAKE 1 CAPSULE BY MOUTH 1 TIME A WEEK 12 capsule 4    amLODIPine (NORVASC) 5 MG tablet Take 1 tablet by mouth daily 90 tablet 4    metFORMIN (GLUCOPHAGE-XR) 500 MG extended release tablet Take 1 tablet by mouth daily (with breakfast) 90 tablet 1    allopurinol (ZYLOPRIM) 300 MG tablet TAKE 1 TABLET BY MOUTH DAILY 90 tablet 1    metoprolol succinate (TOPROL XL) 100 MG extended release tablet TAKE 1 TABLET BY MOUTH ONCE A DAY 90 tablet 4    fluticasone (FLONASE) 50 MCG/ACT nasal spray SHAKE LIQUID AND USE 1 SPRAY IN EACH NOSTRIL DAILY 48 g 3    fexofenadine (ALLEGRA) 180 MG tablet Take 1 tablet by mouth daily 90 tablet 1    lisinopril (PRINIVIL;ZESTRIL) 20 MG tablet TAKE 1 TABLET BY MOUTH ONCE A DAY 90 tablet 1    pregabalin (LYRICA) 50 MG capsule       lamoTRIgine (LAMICTAL) 25 MG tablet       LORazepam (ATIVAN) 2 MG tablet       pregabalin (LYRICA) 100 MG capsule Take 1 capsule by mouth 2 times daily.      traMADol (ULTRAM) 50 MG tablet 1-2 tablet by mouth 3 times a day  for pain prn 120 tablet 0     No current facility-administered medications for this visit.     PMH, Surgical Hx, Family Hx, and Social Hx reviewed and updated.  Health Maintenance reviewed.    Objective  Vitals:    01/30/24 1402   BP: 122/88   Site: Left Upper Arm   Position: Sitting   Cuff Size: Large Adult   Pulse: 65   Resp: 16   SpO2: 97%   Weight: 104.3 kg (230 lb)   Height: 1.727 m

## 2024-02-24 DIAGNOSIS — N18.31 STAGE 3A CHRONIC KIDNEY DISEASE (HCC): ICD-10-CM

## 2024-02-24 DIAGNOSIS — I10 BENIGN ESSENTIAL HTN: ICD-10-CM

## 2024-02-26 RX ORDER — LISINOPRIL 20 MG/1
TABLET ORAL
Qty: 90 TABLET | Refills: 1 | Status: SHIPPED | OUTPATIENT
Start: 2024-02-26

## 2024-02-26 NOTE — TELEPHONE ENCOUNTER
Comments:     Last Office Visit (last PCP visit):   1/30/2024    Next Visit Date:  No future appointments.    **If hasn't been seen in over a year OR hasn't followed up according to last diabetes/ADHD visit, make appointment for patient before sending refill to provider.    Rx requested:  Requested Prescriptions     Pending Prescriptions Disp Refills    lisinopril (PRINIVIL;ZESTRIL) 20 MG tablet [Pharmacy Med Name: LISINOPRIL 20 MG TABLET 20 Tablet] 90 tablet 1     Sig: TAKE 1 TABLET BY MOUTH ONCE A DAY

## 2024-03-23 DIAGNOSIS — M62.838 MUSCLE SPASM: ICD-10-CM

## 2024-03-25 NOTE — TELEPHONE ENCOUNTER
Comments:     Last Office Visit (last PCP visit):   1/30/2024    Next Visit Date:  No future appointments.    **If hasn't been seen in over a year OR hasn't followed up according to last diabetes/ADHD visit, make appointment for patient before sending refill to provider.    Rx requested:  Requested Prescriptions     Pending Prescriptions Disp Refills    cyclobenzaprine (FLEXERIL) 5 MG tablet [Pharmacy Med Name: CYCLOBENZAPRINE HCL 5 MG TA 5 Tablet] 30 tablet 2     Sig: TAKE 1 TABLET BY MOUTH NIGHTLY AS NEEDED FOR MUSCLE SPASMS

## 2024-03-26 RX ORDER — CYCLOBENZAPRINE HCL 5 MG
5 TABLET ORAL NIGHTLY PRN
Qty: 30 TABLET | Refills: 2 | Status: SHIPPED | OUTPATIENT
Start: 2024-03-26 | End: 2024-06-24

## 2024-04-29 RX ORDER — METFORMIN HYDROCHLORIDE 500 MG/1
500 TABLET, EXTENDED RELEASE ORAL
Qty: 90 TABLET | Refills: 5 | Status: SHIPPED | OUTPATIENT
Start: 2024-04-29

## 2024-04-29 NOTE — TELEPHONE ENCOUNTER
Comments:     Last Office Visit (last PCP visit):   1/30/2024    Next Visit Date:  Future Appointments   Date Time Provider Department Center   5/23/2024  9:15 AM Norma Espinosa PA-C [facility]       **If hasn't been seen in over a year OR hasn't followed up according to last diabetes/ADHD visit, make appointment for patient before sending refill to provider.    Rx requested:  Requested Prescriptions     Pending Prescriptions Disp Refills    metFORMIN (GLUCOPHAGE-XR) 500 MG extended release tablet [Pharmacy Med Name: METFORMIN HCL  MG TB2 500 Tablet] 90 tablet 0     Sig: TAKE 1 TABLET BY MOUTH DAILY (WITH BREAKFAST)

## 2024-05-23 ENCOUNTER — OFFICE VISIT (OUTPATIENT)
Dept: FAMILY MEDICINE CLINIC | Age: 69
End: 2024-05-23
Payer: MEDICARE

## 2024-05-23 VITALS
DIASTOLIC BLOOD PRESSURE: 70 MMHG | RESPIRATION RATE: 16 BRPM | SYSTOLIC BLOOD PRESSURE: 110 MMHG | HEART RATE: 63 BPM | HEIGHT: 68 IN | WEIGHT: 227.07 LBS | OXYGEN SATURATION: 96 % | BODY MASS INDEX: 34.41 KG/M2

## 2024-05-23 VITALS
SYSTOLIC BLOOD PRESSURE: 138 MMHG | HEART RATE: 63 BPM | OXYGEN SATURATION: 96 % | RESPIRATION RATE: 16 BRPM | WEIGHT: 227 LBS | BODY MASS INDEX: 34.4 KG/M2 | HEIGHT: 68 IN | DIASTOLIC BLOOD PRESSURE: 90 MMHG

## 2024-05-23 DIAGNOSIS — Z00.00 MEDICARE ANNUAL WELLNESS VISIT, SUBSEQUENT: Primary | ICD-10-CM

## 2024-05-23 DIAGNOSIS — E55.9 VITAMIN D DEFICIENCY: ICD-10-CM

## 2024-05-23 DIAGNOSIS — M47.816 ARTHROPATHY OF LUMBAR FACET JOINT: ICD-10-CM

## 2024-05-23 DIAGNOSIS — I10 BENIGN ESSENTIAL HTN: ICD-10-CM

## 2024-05-23 DIAGNOSIS — M79.2 NEUROPATHIC PAIN SYNDROME (NON-HERPETIC): ICD-10-CM

## 2024-05-23 DIAGNOSIS — R73.03 PREDIABETES: ICD-10-CM

## 2024-05-23 DIAGNOSIS — N18.31 STAGE 3A CHRONIC KIDNEY DISEASE (HCC): Primary | ICD-10-CM

## 2024-05-23 DIAGNOSIS — N18.31 STAGE 3A CHRONIC KIDNEY DISEASE (HCC): ICD-10-CM

## 2024-05-23 DIAGNOSIS — M48.062 SPINAL STENOSIS OF LUMBAR REGION WITH NEUROGENIC CLAUDICATION: ICD-10-CM

## 2024-05-23 LAB
ALBUMIN SERPL-MCNC: 4.2 G/DL (ref 3.5–4.6)
ALP SERPL-CCNC: 97 U/L (ref 40–130)
ALT SERPL-CCNC: 31 U/L (ref 0–33)
ANION GAP SERPL CALCULATED.3IONS-SCNC: 9 MEQ/L (ref 9–15)
AST SERPL-CCNC: 24 U/L (ref 0–35)
BILIRUB SERPL-MCNC: 0.8 MG/DL (ref 0.2–0.7)
BUN SERPL-MCNC: 11 MG/DL (ref 8–23)
CALCIUM SERPL-MCNC: 9.1 MG/DL (ref 8.5–9.9)
CHLORIDE SERPL-SCNC: 105 MEQ/L (ref 95–107)
CO2 SERPL-SCNC: 27 MEQ/L (ref 20–31)
CREAT SERPL-MCNC: 0.8 MG/DL (ref 0.5–0.9)
ERYTHROCYTE [DISTWIDTH] IN BLOOD BY AUTOMATED COUNT: 14 % (ref 11.5–14.5)
GLOBULIN SER CALC-MCNC: 3.2 G/DL (ref 2.3–3.5)
GLUCOSE SERPL-MCNC: 93 MG/DL (ref 70–99)
HBA1C MFR BLD: 6 %
HCT VFR BLD AUTO: 38.2 % (ref 37–47)
HGB BLD-MCNC: 12.4 G/DL (ref 12–16)
MCH RBC QN AUTO: 27.6 PG (ref 27–31.3)
MCHC RBC AUTO-ENTMCNC: 32.5 % (ref 33–37)
MCV RBC AUTO: 85.1 FL (ref 79.4–94.8)
PLATELET # BLD AUTO: 264 K/UL (ref 130–400)
POTASSIUM SERPL-SCNC: 4.1 MEQ/L (ref 3.4–4.9)
PROT SERPL-MCNC: 7.4 G/DL (ref 6.3–8)
RBC # BLD AUTO: 4.49 M/UL (ref 4.2–5.4)
SODIUM SERPL-SCNC: 141 MEQ/L (ref 135–144)
URATE SERPL-MCNC: 6.3 MG/DL (ref 2.4–5.7)
WBC # BLD AUTO: 5.2 K/UL (ref 4.8–10.8)

## 2024-05-23 PROCEDURE — 83036 HEMOGLOBIN GLYCOSYLATED A1C: CPT | Performed by: PHYSICIAN ASSISTANT

## 2024-05-23 PROCEDURE — 1123F ACP DISCUSS/DSCN MKR DOCD: CPT | Performed by: PHYSICIAN ASSISTANT

## 2024-05-23 PROCEDURE — 3078F DIAST BP <80 MM HG: CPT | Performed by: PHYSICIAN ASSISTANT

## 2024-05-23 PROCEDURE — G8427 DOCREV CUR MEDS BY ELIG CLIN: HCPCS | Performed by: PHYSICIAN ASSISTANT

## 2024-05-23 PROCEDURE — 3080F DIAST BP >= 90 MM HG: CPT | Performed by: PHYSICIAN ASSISTANT

## 2024-05-23 PROCEDURE — 3074F SYST BP LT 130 MM HG: CPT | Performed by: PHYSICIAN ASSISTANT

## 2024-05-23 PROCEDURE — 99214 OFFICE O/P EST MOD 30 MIN: CPT | Performed by: PHYSICIAN ASSISTANT

## 2024-05-23 PROCEDURE — 1036F TOBACCO NON-USER: CPT | Performed by: PHYSICIAN ASSISTANT

## 2024-05-23 PROCEDURE — 1090F PRES/ABSN URINE INCON ASSESS: CPT | Performed by: PHYSICIAN ASSISTANT

## 2024-05-23 PROCEDURE — G0439 PPPS, SUBSEQ VISIT: HCPCS | Performed by: PHYSICIAN ASSISTANT

## 2024-05-23 PROCEDURE — 3017F COLORECTAL CA SCREEN DOC REV: CPT | Performed by: PHYSICIAN ASSISTANT

## 2024-05-23 PROCEDURE — G8417 CALC BMI ABV UP PARAM F/U: HCPCS | Performed by: PHYSICIAN ASSISTANT

## 2024-05-23 PROCEDURE — 3075F SYST BP GE 130 - 139MM HG: CPT | Performed by: PHYSICIAN ASSISTANT

## 2024-05-23 PROCEDURE — G8400 PT W/DXA NO RESULTS DOC: HCPCS | Performed by: PHYSICIAN ASSISTANT

## 2024-05-23 RX ORDER — BLOOD-GLUCOSE METER
1 KIT MISCELLANEOUS DAILY
Qty: 1 KIT | Refills: 0 | Status: SHIPPED | OUTPATIENT
Start: 2024-05-23

## 2024-05-23 RX ORDER — EMPAGLIFLOZIN 10 MG/1
TABLET, FILM COATED ORAL
COMMUNITY
Start: 2024-03-25 | End: 2024-05-23

## 2024-05-23 RX ORDER — ERGOCALCIFEROL 1.25 MG/1
CAPSULE ORAL
Qty: 12 CAPSULE | Refills: 4 | Status: SHIPPED | OUTPATIENT
Start: 2024-05-23

## 2024-05-23 RX ORDER — GLUCOSAMINE HCL/CHONDROITIN SU 500-400 MG
CAPSULE ORAL
Qty: 300 STRIP | Refills: 2 | Status: SHIPPED | OUTPATIENT
Start: 2024-05-23

## 2024-05-23 ASSESSMENT — ENCOUNTER SYMPTOMS
COLOR CHANGE: 0
RECTAL PAIN: 0
ABDOMINAL PAIN: 0
SINUS PRESSURE: 0
BLOOD IN STOOL: 0
DIARRHEA: 0
SINUS PAIN: 0
CHEST TIGHTNESS: 0
COUGH: 0
ABDOMINAL DISTENTION: 0
NAUSEA: 0
SHORTNESS OF BREATH: 0
BACK PAIN: 1

## 2024-05-23 ASSESSMENT — PATIENT HEALTH QUESTIONNAIRE - PHQ9
SUM OF ALL RESPONSES TO PHQ QUESTIONS 1-9: 1
SUM OF ALL RESPONSES TO PHQ9 QUESTIONS 1 & 2: 1
1. LITTLE INTEREST OR PLEASURE IN DOING THINGS: NOT AT ALL
2. FEELING DOWN, DEPRESSED OR HOPELESS: SEVERAL DAYS
SUM OF ALL RESPONSES TO PHQ QUESTIONS 1-9: 1

## 2024-05-23 NOTE — PROGRESS NOTES
Subjective  Estefani Jones, 68 y.o. female presents today with:  Chief Complaint   Patient presents with    Follow-up     Follow up on pre diabetes        HPI  Last OV with me: 1/30/24.     Has been off of allopurinol.      Tolerating metformin.  Would like to continue, taking once daily.  No side effects.       Has been compliant with other medications.      Otherwise, has been feeling OK.     Increased stress with some personal/family matters.  She is followed by [facility].    Long-standing history of panic and [place].    Worse with covid pandemic.   AT this time, she feels OK.      Chronic back pain.  +stenosis.  She is followed by pain management at [facility].  Takes tramadol for pain, feels like this just makes her feel 'off and drugged'.  Would be interested in speaking to someone about ablation/injection.  Open to [facility] referral.        Review of Systems   Constitutional:  Negative for activity change, appetite change, chills, diaphoresis, fatigue, fever and unexpected weight change.   HENT:  Negative for congestion, postnasal drip, sinus pressure, sinus pain and sneezing.    Respiratory:  Negative for cough, chest tightness and shortness of breath.    Cardiovascular:  Negative for chest pain, palpitations and leg swelling.   Gastrointestinal:  Negative for abdominal distention, abdominal pain, blood in stool, diarrhea, nausea and rectal pain.   Endocrine: Negative for polydipsia, polyphagia and polyuria.   Genitourinary:  Negative for decreased urine volume, enuresis, frequency, urgency, vaginal bleeding and vaginal discharge.   Musculoskeletal:  Positive for arthralgias, back pain and myalgias. Negative for gait problem and joint swelling.        Chronic   Skin:  Negative for color change and rash.   Allergic/Immunologic: Positive for environmental allergies.   Neurological:  Positive for weakness and numbness. Negative for dizziness, light-headedness and headaches (tension-type).

## 2024-05-23 NOTE — PATIENT INSTRUCTIONS

## 2024-05-23 NOTE — PROGRESS NOTES
succinate (TOPROL XL) 100 MG extended release tablet TAKE 1 TABLET BY MOUTH ONCE A DAY  Norma Espinosa PA-C   fluticasone (FLONASE) 50 MCG/ACT nasal spray SHAKE LIQUID AND USE 1 SPRAY IN EACH NOSTRIL DAILY  Norma Espinosa PA-C   fexofenadine (ALLEGRA) 180 MG tablet Take 1 tablet by mouth daily  Norma Espinosa PA-C   pregabalin (LYRICA) 50 MG capsule   Sukhdev Romero MD   lamoTRIgine (LAMICTAL) 25 MG tablet   Sukhdev Romero MD   LORazepam (ATIVAN) 2 MG tablet   Sukhdev Romero MD   pregabalin (LYRICA) 100 MG capsule Take 1 capsule by mouth 2 times daily.  ProviderSukhdev MD   traMADol (ULTRAM) 50 MG tablet 1-2 tablet by mouth 3 times a day  for pain Teddy Fontenot MD CareTeam (Including outside providers/suppliers regularly involved in providing care):   Patient Care Team:  Norma Espinosa PA-C as PCP - General (Family Medicine)  Norma Espinosa PA-C as PCP - Empaneled Provider     Reviewed and updated this visit:  Allergies  Meds  Problems

## 2024-06-05 RX ORDER — EMPAGLIFLOZIN 10 MG/1
10 TABLET, FILM COATED ORAL DAILY
Qty: 90 TABLET | Refills: 1 | OUTPATIENT
Start: 2024-06-05

## 2024-06-12 PROBLEM — E11.8 TYPE 2 DIABETES MELLITUS WITH UNSPECIFIED COMPLICATIONS: Status: ACTIVE | Noted: 2024-06-12

## 2024-06-12 PROBLEM — G31.9 DEGENERATIVE DISEASE OF NERVOUS SYSTEM, UNSPECIFIED: Status: ACTIVE | Noted: 2024-06-12

## 2024-06-15 DIAGNOSIS — M62.838 MUSCLE SPASM: ICD-10-CM

## 2024-06-17 ENCOUNTER — PATIENT OUTREACH (OUTPATIENT)
Dept: ADMINISTRATIVE | Facility: HOSPITAL | Age: 69
End: 2024-06-17

## 2024-06-17 RX ORDER — CYCLOBENZAPRINE HCL 5 MG
5 TABLET ORAL NIGHTLY PRN
Qty: 30 TABLET | Refills: 2 | OUTPATIENT
Start: 2024-06-17 | End: 2024-09-15

## 2024-06-17 NOTE — TELEPHONE ENCOUNTER
Comments:     The original prescription was discontinued on 5/23/2024 by Norma Espinosa PA-C for the following reason: LIST CLEANUP. Renewing this prescription may not be appropriate.       Last Office Visit (last PCP visit):   5/23/2024    Next Visit Date:  Future Appointments   Date Time Provider Department Center   6/17/2024 10:30 AM Kaushik Del Real DO [place] [facility]   9/23/2024  9:30 AM Norma Espinosa PA-C [place]  [facility]       **If hasn't been seen in over a year OR hasn't followed up according to last diabetes/ADHD visit, make appointment for patient before sending refill to provider.    Rx requested:  Requested Prescriptions     Pending Prescriptions Disp Refills    cyclobenzaprine (FLEXERIL) 5 MG tablet [Pharmacy Med Name: CYCLOBENZAPRINE HCL 5 MG TA 5 Tablet] 30 tablet 2     Sig: TAKE 1 TABLET BY MOUTH NIGHTLY AS NEEDED FOR MUSCLE SPASMS

## 2024-08-05 ENCOUNTER — TELEPHONE (OUTPATIENT)
Dept: FAMILY MEDICINE CLINIC | Age: 69
End: 2024-08-05

## 2024-08-05 NOTE — TELEPHONE ENCOUNTER
Patient states she has had very yellow urine and severe pain on her right side for about 3 days.    Patient is requesting an antibiotic and something for the pain.    Please Advise.    # [phone]

## 2024-08-06 NOTE — TELEPHONE ENCOUNTER
Left VM informing patient to come into the walk in or to send an e-visit to Norma via [employer].

## 2024-09-23 ENCOUNTER — TELEPHONE (OUTPATIENT)
Dept: FAMILY MEDICINE CLINIC | Age: 69
End: 2024-09-23

## 2024-10-05 DIAGNOSIS — N18.31 STAGE 3A CHRONIC KIDNEY DISEASE (HCC): ICD-10-CM

## 2024-10-05 DIAGNOSIS — I10 BENIGN ESSENTIAL HTN: ICD-10-CM

## 2024-10-07 RX ORDER — LISINOPRIL 20 MG/1
TABLET ORAL
Qty: 90 TABLET | Refills: 1 | Status: SHIPPED | OUTPATIENT
Start: 2024-10-07

## 2024-10-07 NOTE — TELEPHONE ENCOUNTER
Comments:     Last Office Visit (last PCP visit):   5/23/2024    Next Visit Date:  Future Appointments   Date Time Provider Department Center   11/21/2024 10:15 AM Norma Espinosa PA-C [place] [facility]       **If hasn't been seen in over a year OR hasn't followed up according to last diabetes/ADHD visit, make appointment for patient before sending refill to provider.    Rx requested:  Requested Prescriptions     Pending Prescriptions Disp Refills    lisinopril (PRINIVIL;ZESTRIL) 20 MG tablet [Pharmacy Med Name: LISINOPRIL 20 MG TABLET 20 Tablet] 90 tablet 1     Sig: TAKE 1 TABLET BY MOUTH ONCE A DAY

## 2024-10-23 ENCOUNTER — TELEPHONE (OUTPATIENT)
Dept: FAMILY MEDICINE CLINIC | Age: 69
End: 2024-10-23

## 2024-10-23 DIAGNOSIS — R73.03 PREDIABETES: Primary | ICD-10-CM

## 2024-10-23 NOTE — TELEPHONE ENCOUNTER
Pt called and stated that she has glucose monitoring kit and strips, but still needs lancet needles and alcohol wipes.    Pt would like Norma to call in a script    Pharmacy is [facility]

## 2024-10-24 RX ORDER — LANCETS 30 GAUGE
1 EACH MISCELLANEOUS 2 TIMES DAILY
Qty: 300 EACH | Refills: 1 | Status: SHIPPED | OUTPATIENT
Start: 2024-10-24

## 2024-10-24 RX ORDER — ISOPROPYL ALCOHOL 700 MG/ML
CLOTH TOPICAL
Qty: 100 EACH | Refills: 3 | Status: SHIPPED | OUTPATIENT
Start: 2024-10-24

## 2024-10-28 RX ORDER — ALLOPURINOL 300 MG/1
300 TABLET ORAL DAILY
Qty: 90 TABLET | Refills: 1 | Status: SHIPPED | OUTPATIENT
Start: 2024-10-28

## 2024-10-28 NOTE — TELEPHONE ENCOUNTER
Comments:     Last Office Visit (last PCP visit):   5/23/2024    Next Visit Date:  Future Appointments   Date Time Provider Department Center   11/21/2024 10:15 AM Norma Espinosa PA-C [place] [facility]       **If hasn't been seen in over a year OR hasn't followed up according to last diabetes/ADHD visit, make appointment for patient before sending refill to provider.    Rx requested:  Requested Prescriptions     Pending Prescriptions Disp Refills    allopurinol (ZYLOPRIM) 300 MG tablet [Pharmacy Med Name: ALLOPURINOL 300 MG TABS 300 Tablet] 90 tablet 1     Sig: TAKE 1 TABLET BY MOUTH DAILY

## 2024-10-30 DIAGNOSIS — I10 BENIGN ESSENTIAL HTN: ICD-10-CM

## 2024-10-30 DIAGNOSIS — Z76.0 MEDICATION REFILL: ICD-10-CM

## 2024-10-31 NOTE — TELEPHONE ENCOUNTER
Comments:     Last Office Visit (last PCP visit):   5/23/2024    Next Visit Date:  Future Appointments   Date Time Provider Department Center   1/3/2025  9:45 AM Norma Espinosa PA-C [place] [facility]       **If hasn't been seen in over a year OR hasn't followed up according to last diabetes/ADHD visit, make appointment for patient before sending refill to provider.    Rx requested:  Requested Prescriptions     Pending Prescriptions Disp Refills    metoprolol succinate (TOPROL XL) 100 MG extended release tablet [Pharmacy Med Name: METOPROLOL SUCCINATE  100 Tablet] 90 tablet 4     Sig: TAKE 1 TABLET BY MOUTH ONCE A DAY

## 2024-11-01 RX ORDER — METOPROLOL SUCCINATE 100 MG/1
TABLET, EXTENDED RELEASE ORAL
Qty: 90 TABLET | Refills: 4 | Status: SHIPPED | OUTPATIENT
Start: 2024-11-01

## 2024-12-17 ENCOUNTER — OFFICE VISIT (OUTPATIENT)
Age: 69
End: 2024-12-17
Payer: MEDICARE

## 2024-12-17 VITALS
WEIGHT: 223 LBS | SYSTOLIC BLOOD PRESSURE: 130 MMHG | RESPIRATION RATE: 16 BRPM | HEART RATE: 78 BPM | OXYGEN SATURATION: 98 % | BODY MASS INDEX: 33.8 KG/M2 | HEIGHT: 68 IN | DIASTOLIC BLOOD PRESSURE: 88 MMHG

## 2024-12-17 DIAGNOSIS — E55.9 VITAMIN D DEFICIENCY: ICD-10-CM

## 2024-12-17 DIAGNOSIS — I10 BENIGN ESSENTIAL HTN: ICD-10-CM

## 2024-12-17 DIAGNOSIS — M48.062 SPINAL STENOSIS OF LUMBAR REGION WITH NEUROGENIC CLAUDICATION: ICD-10-CM

## 2024-12-17 DIAGNOSIS — Z12.31 BREAST CANCER SCREENING BY MAMMOGRAM: ICD-10-CM

## 2024-12-17 DIAGNOSIS — J30.2 SEASONAL ALLERGIES: ICD-10-CM

## 2024-12-17 DIAGNOSIS — F41.9 ANXIETY: ICD-10-CM

## 2024-12-17 DIAGNOSIS — N18.31 STAGE 3A CHRONIC KIDNEY DISEASE (HCC): Primary | ICD-10-CM

## 2024-12-17 DIAGNOSIS — R73.03 PREDIABETES: ICD-10-CM

## 2024-12-17 DIAGNOSIS — N18.31 STAGE 3A CHRONIC KIDNEY DISEASE (HCC): ICD-10-CM

## 2024-12-17 PROBLEM — N89.8 VAGINAL ITCHING: Status: RESOLVED | Noted: 2023-10-04 | Resolved: 2024-12-17

## 2024-12-17 LAB
ALBUMIN SERPL-MCNC: 4.5 G/DL (ref 3.5–4.6)
ALP SERPL-CCNC: 116 U/L (ref 40–130)
ALT SERPL-CCNC: 33 U/L (ref 0–33)
ANION GAP SERPL CALCULATED.3IONS-SCNC: 11 MEQ/L (ref 9–15)
AST SERPL-CCNC: 22 U/L (ref 0–35)
BILIRUB SERPL-MCNC: 0.7 MG/DL (ref 0.2–0.7)
BUN SERPL-MCNC: 10 MG/DL (ref 8–23)
CALCIUM SERPL-MCNC: 9.9 MG/DL (ref 8.5–9.9)
CHLORIDE SERPL-SCNC: 105 MEQ/L (ref 95–107)
CHOLEST SERPL-MCNC: 138 MG/DL (ref 0–199)
CO2 SERPL-SCNC: 26 MEQ/L (ref 20–31)
CREAT SERPL-MCNC: 0.89 MG/DL (ref 0.5–0.9)
CREAT UR-MCNC: 147.6 MG/DL
ERYTHROCYTE [DISTWIDTH] IN BLOOD BY AUTOMATED COUNT: 13.3 % (ref 11.5–14.5)
ESTIMATED AVERAGE GLUCOSE: 128 MG/DL
GLOBULIN SER CALC-MCNC: 3.2 G/DL (ref 2.3–3.5)
GLUCOSE SERPL-MCNC: 119 MG/DL (ref 70–99)
HBA1C MFR BLD: 6.1 % (ref 4–6)
HCT VFR BLD AUTO: 40.3 % (ref 37–47)
HDLC SERPL-MCNC: 53 MG/DL (ref 40–59)
HGB BLD-MCNC: 13.5 G/DL (ref 12–16)
LDL CHOLESTEROL: 64 MG/DL (ref 0–129)
MCH RBC QN AUTO: 28.6 PG (ref 27–31.3)
MCHC RBC AUTO-ENTMCNC: 33.5 % (ref 33–37)
MCV RBC AUTO: 85.4 FL (ref 79.4–94.8)
MICROALBUMIN UR-MCNC: 2.5 MG/DL
MICROALBUMIN/CREAT UR-RTO: 16.9 MG/G (ref 0–30)
PLATELET # BLD AUTO: 246 K/UL (ref 130–400)
POTASSIUM SERPL-SCNC: 4.3 MEQ/L (ref 3.4–4.9)
PROT SERPL-MCNC: 7.7 G/DL (ref 6.3–8)
RBC # BLD AUTO: 4.72 M/UL (ref 4.2–5.4)
SODIUM SERPL-SCNC: 142 MEQ/L (ref 135–144)
TRIGLYCERIDE, FASTING: 103 MG/DL (ref 0–150)
URATE SERPL-MCNC: 6.6 MG/DL (ref 2.4–5.7)
WBC # BLD AUTO: 9 K/UL (ref 4.8–10.8)

## 2024-12-17 PROCEDURE — 3079F DIAST BP 80-89 MM HG: CPT | Performed by: PHYSICIAN ASSISTANT

## 2024-12-17 PROCEDURE — 1036F TOBACCO NON-USER: CPT | Performed by: PHYSICIAN ASSISTANT

## 2024-12-17 PROCEDURE — 3017F COLORECTAL CA SCREEN DOC REV: CPT | Performed by: PHYSICIAN ASSISTANT

## 2024-12-17 PROCEDURE — 1159F MED LIST DOCD IN RCRD: CPT | Performed by: PHYSICIAN ASSISTANT

## 2024-12-17 PROCEDURE — G8484 FLU IMMUNIZE NO ADMIN: HCPCS | Performed by: PHYSICIAN ASSISTANT

## 2024-12-17 PROCEDURE — 1090F PRES/ABSN URINE INCON ASSESS: CPT | Performed by: PHYSICIAN ASSISTANT

## 2024-12-17 PROCEDURE — G8400 PT W/DXA NO RESULTS DOC: HCPCS | Performed by: PHYSICIAN ASSISTANT

## 2024-12-17 PROCEDURE — G8417 CALC BMI ABV UP PARAM F/U: HCPCS | Performed by: PHYSICIAN ASSISTANT

## 2024-12-17 PROCEDURE — 3075F SYST BP GE 130 - 139MM HG: CPT | Performed by: PHYSICIAN ASSISTANT

## 2024-12-17 PROCEDURE — G8427 DOCREV CUR MEDS BY ELIG CLIN: HCPCS | Performed by: PHYSICIAN ASSISTANT

## 2024-12-17 PROCEDURE — 1123F ACP DISCUSS/DSCN MKR DOCD: CPT | Performed by: PHYSICIAN ASSISTANT

## 2024-12-17 PROCEDURE — 99214 OFFICE O/P EST MOD 30 MIN: CPT | Performed by: PHYSICIAN ASSISTANT

## 2024-12-17 RX ORDER — FEXOFENADINE HCL 180 MG/1
180 TABLET ORAL DAILY
Qty: 90 TABLET | Refills: 1 | Status: SHIPPED | OUTPATIENT
Start: 2024-12-17

## 2024-12-17 SDOH — ECONOMIC STABILITY: FOOD INSECURITY: WITHIN THE PAST 12 MONTHS, YOU WORRIED THAT YOUR FOOD WOULD RUN OUT BEFORE YOU GOT MONEY TO BUY MORE.: NEVER TRUE

## 2024-12-17 SDOH — ECONOMIC STABILITY: INCOME INSECURITY: HOW HARD IS IT FOR YOU TO PAY FOR THE VERY BASICS LIKE FOOD, HOUSING, MEDICAL CARE, AND HEATING?: NOT HARD AT ALL

## 2024-12-17 SDOH — ECONOMIC STABILITY: FOOD INSECURITY: WITHIN THE PAST 12 MONTHS, THE FOOD YOU BOUGHT JUST DIDN'T LAST AND YOU DIDN'T HAVE MONEY TO GET MORE.: NEVER TRUE

## 2024-12-17 ASSESSMENT — ENCOUNTER SYMPTOMS
SHORTNESS OF BREATH: 0
NAUSEA: 0
BLOOD IN STOOL: 0
SINUS PAIN: 0
BACK PAIN: 1
SINUS PRESSURE: 0
COLOR CHANGE: 0
DIARRHEA: 0
ABDOMINAL DISTENTION: 0
RECTAL PAIN: 0
ABDOMINAL PAIN: 0
CHEST TIGHTNESS: 0
COUGH: 0

## 2024-12-17 NOTE — PROGRESS NOTES
Subjective  Estefani Jones, 69 y.o. female presents today with:  Chief Complaint   Patient presents with    Chronic Kidney Disease     Follow up        HPI  Last OV with me: 5/23/24.     Overall, feeling OK.  Stable on current medications.    She is due for fasting labs, she can go today.     Needing refill of allergy medication.  This helped with congestion/PND.      BP remains stable/controlled.  CKD-renal function is stable.      She is followed by pain management, she is taking tramadol for chronic back pain.  She has gone to PT, received injections, has been thinking about a pain stim.    Activity is affected by her back pain/discomfort.   No falls.     She is on 500 mg er metformin for prediabetes.   Trying to watching her CHO/sugar intake.    Review of Systems   Constitutional:  Negative for activity change, appetite change, chills, diaphoresis, fatigue, fever and unexpected weight change.   HENT:  Negative for congestion, postnasal drip, sinus pressure, sinus pain and sneezing.    Respiratory:  Negative for cough, chest tightness and shortness of breath.    Cardiovascular:  Negative for chest pain, palpitations and leg swelling.   Gastrointestinal:  Negative for abdominal distention, abdominal pain, blood in stool, diarrhea, nausea and rectal pain.   Endocrine: Negative for polydipsia, polyphagia and polyuria.   Genitourinary:  Negative for decreased urine volume, enuresis, frequency, urgency, vaginal bleeding and vaginal discharge.   Musculoskeletal:  Positive for arthralgias, back pain and myalgias. Negative for gait problem and joint swelling.        Chronic   Skin:  Negative for color change and rash.   Allergic/Immunologic: Positive for environmental allergies.   Neurological:  Positive for weakness. Negative for dizziness, light-headedness, numbness and headaches (tension-type).   Psychiatric/Behavioral:  Negative for agitation, dysphoric mood, self-injury, sleep disturbance and suicidal ideas.

## 2024-12-23 ENCOUNTER — TELEPHONE (OUTPATIENT)
Age: 69
End: 2024-12-23

## 2024-12-29 PROBLEM — E78.2 MIXED HYPERLIPIDEMIA: Status: ACTIVE | Noted: 2024-12-29

## 2024-12-29 PROBLEM — F41.8 ANXIETY WITH DEPRESSION: Status: ACTIVE | Noted: 2024-12-29

## 2025-01-02 DIAGNOSIS — J30.2 SEASONAL ALLERGIES: ICD-10-CM

## 2025-01-02 NOTE — TELEPHONE ENCOUNTER
Comments:     Last Office Visit (last PCP visit):   12/17/2024    Next Visit Date:  Future Appointments   Date Time Provider Department Center   2/10/2025  9:20 AM [facility] 1 [facility]   4/17/2025 11:00 AM Norma Espinosa PA-C [facility] DE[facility]       **If hasn't been seen in over a year OR hasn't followed up according to last diabetes/ADHD visit, make appointment for patient before sending refill to provider.    Rx requested:  Requested Prescriptions     Pending Prescriptions Disp Refills    fluticasone (FLONASE) 50 MCG/ACT nasal spray [Pharmacy Med Name: FLUTICASONE PROP 50 MCG SPR 50 ROGER] 16 g 3     Sig: SHAKE LIQUID AND USE 1 SPRAY IN EACH NOSTRIL DAILY

## 2025-01-03 RX ORDER — FLUTICASONE PROPIONATE 50 MCG
SPRAY, SUSPENSION (ML) NASAL
Qty: 16 G | Refills: 3 | Status: SHIPPED | OUTPATIENT
Start: 2025-01-03

## 2025-01-14 DIAGNOSIS — N18.31 STAGE 3A CHRONIC KIDNEY DISEASE (HCC): ICD-10-CM

## 2025-01-14 DIAGNOSIS — I10 BENIGN ESSENTIAL HTN: ICD-10-CM

## 2025-01-14 NOTE — TELEPHONE ENCOUNTER
Comments:     Last Office Visit (last PCP visit):   12/17/2024    Next Visit Date:  Future Appointments   Date Time Provider Department Center   2/10/2025  9:20 AM [facility] ROOM 1 [facility]   4/17/2025 11:00 AM Norma Espinosa PA-C [facility] DE[facility]       **If hasn't been seen in over a year OR hasn't followed up according to last diabetes/ADHD visit, make appointment for patient before sending refill to provider.    Rx requested:  Requested Prescriptions     Pending Prescriptions Disp Refills    lisinopril (PRINIVIL;ZESTRIL) 20 MG tablet [Pharmacy Med Name: LISINOPRIL 20 MG TABLET 20 Tablet] 90 tablet 1     Sig: TAKE 1 TABLET BY MOUTH ONCE A DAY

## 2025-01-15 RX ORDER — LISINOPRIL 20 MG/1
TABLET ORAL
Qty: 90 TABLET | Refills: 1 | Status: SHIPPED | OUTPATIENT
Start: 2025-01-15

## 2025-02-08 DIAGNOSIS — R73.03 PREDIABETES: ICD-10-CM

## 2025-02-10 NOTE — TELEPHONE ENCOUNTER
Comments:     Last Office Visit (last PCP visit):   12/17/2024    Next Visit Date:  Future Appointments   Date Time Provider Department Center   4/17/2025 11:00 AM Norma Espinosa PA-C [facility] DE[facility]         Rx requested:  Requested Prescriptions     Pending Prescriptions Disp Refills    blood glucose test strips (ONETOUCH VERIO) strip [Pharmacy Med Name: ONETOUCH VERIO STRP 100 Strip] 300 strip 3     Sig: TEST 2-3 TIMES A DAY & AS NEEDED FOR SYMPTOMS OF IRREGULAR BLOOD GLUCOSE.PLUS ADDITIOAL TO ACCOMMODATE AS NEEDED TESTING NEEDS

## 2025-02-11 RX ORDER — BLOOD SUGAR DIAGNOSTIC
STRIP MISCELLANEOUS
Qty: 300 STRIP | Refills: 3 | Status: SHIPPED | OUTPATIENT
Start: 2025-02-11

## 2025-03-07 ENCOUNTER — HOSPITAL ENCOUNTER (OUTPATIENT)
Dept: WOMENS IMAGING | Age: 70
Discharge: HOME | End: 2025-03-09
Payer: MEDICARE

## 2025-03-07 DIAGNOSIS — Z12.31 BREAST CANCER SCREENING BY MAMMOGRAM: ICD-10-CM

## 2025-03-07 PROCEDURE — 77063 BREAST TOMOSYNTHESIS BI: CPT

## 2025-03-16 ENCOUNTER — RESULTS FOLLOW-UP (OUTPATIENT)
Dept: WOMENS IMAGING | Age: 70
End: 2025-03-16

## 2025-03-20 ENCOUNTER — PATIENT OUTREACH (OUTPATIENT)
Dept: ADMINISTRATIVE | Facility: HOSPITAL | Age: 70
End: 2025-03-20

## 2025-03-21 PROBLEM — H40.1131 PRIMARY OPEN ANGLE GLAUCOMA OF BOTH EYES, MILD STAGE: Status: ACTIVE | Noted: 2025-03-21

## 2025-03-21 PROBLEM — H40.2231 CHRONIC ANGLE-CLOSURE GLAUCOMA OF BOTH EYES, MILD STAGE: Status: ACTIVE | Noted: 2025-03-21

## 2025-04-09 ENCOUNTER — TELEPHONE (OUTPATIENT)
Age: 70
End: 2025-04-09

## 2025-04-09 DIAGNOSIS — N18.31 STAGE 3A CHRONIC KIDNEY DISEASE (HCC): ICD-10-CM

## 2025-04-09 NOTE — TELEPHONE ENCOUNTER
Patient states she has run out of Amlodipine and pharmacy states she is too early to fill. Patient is asking for 30 day rx.    Please advise    Callback [phone]

## 2025-04-10 RX ORDER — AMLODIPINE BESYLATE 5 MG/1
5 TABLET ORAL DAILY
Qty: 90 TABLET | Refills: 4 | Status: SHIPPED | OUTPATIENT
Start: 2025-04-10

## 2025-04-16 ENCOUNTER — OFFICE VISIT (OUTPATIENT)
Age: 70
End: 2025-04-16
Payer: MEDICARE

## 2025-04-16 VITALS
SYSTOLIC BLOOD PRESSURE: 122 MMHG | OXYGEN SATURATION: 95 % | HEART RATE: 65 BPM | RESPIRATION RATE: 16 BRPM | DIASTOLIC BLOOD PRESSURE: 82 MMHG | BODY MASS INDEX: 33.75 KG/M2 | WEIGHT: 222 LBS

## 2025-04-16 DIAGNOSIS — R73.09 HEMOGLOBIN A1C ABOVE REFERENCE RANGE: ICD-10-CM

## 2025-04-16 DIAGNOSIS — E55.9 VITAMIN D DEFICIENCY: ICD-10-CM

## 2025-04-16 DIAGNOSIS — N18.31 STAGE 3A CHRONIC KIDNEY DISEASE (HCC): Primary | ICD-10-CM

## 2025-04-16 DIAGNOSIS — M48.062 SPINAL STENOSIS OF LUMBAR REGION WITH NEUROGENIC CLAUDICATION: ICD-10-CM

## 2025-04-16 DIAGNOSIS — I10 BENIGN ESSENTIAL HTN: ICD-10-CM

## 2025-04-16 DIAGNOSIS — R73.03 PREDIABETES: ICD-10-CM

## 2025-04-16 LAB — HBA1C MFR BLD: 6.8 %

## 2025-04-16 PROCEDURE — G8400 PT W/DXA NO RESULTS DOC: HCPCS | Performed by: PHYSICIAN ASSISTANT

## 2025-04-16 PROCEDURE — 99214 OFFICE O/P EST MOD 30 MIN: CPT | Performed by: PHYSICIAN ASSISTANT

## 2025-04-16 PROCEDURE — G8427 DOCREV CUR MEDS BY ELIG CLIN: HCPCS | Performed by: PHYSICIAN ASSISTANT

## 2025-04-16 PROCEDURE — 83036 HEMOGLOBIN GLYCOSYLATED A1C: CPT | Performed by: PHYSICIAN ASSISTANT

## 2025-04-16 PROCEDURE — 3017F COLORECTAL CA SCREEN DOC REV: CPT | Performed by: PHYSICIAN ASSISTANT

## 2025-04-16 PROCEDURE — 1036F TOBACCO NON-USER: CPT | Performed by: PHYSICIAN ASSISTANT

## 2025-04-16 PROCEDURE — 1090F PRES/ABSN URINE INCON ASSESS: CPT | Performed by: PHYSICIAN ASSISTANT

## 2025-04-16 PROCEDURE — G2211 COMPLEX E/M VISIT ADD ON: HCPCS | Performed by: PHYSICIAN ASSISTANT

## 2025-04-16 PROCEDURE — 3074F SYST BP LT 130 MM HG: CPT | Performed by: PHYSICIAN ASSISTANT

## 2025-04-16 PROCEDURE — 3079F DIAST BP 80-89 MM HG: CPT | Performed by: PHYSICIAN ASSISTANT

## 2025-04-16 PROCEDURE — 1123F ACP DISCUSS/DSCN MKR DOCD: CPT | Performed by: PHYSICIAN ASSISTANT

## 2025-04-16 PROCEDURE — 1159F MED LIST DOCD IN RCRD: CPT | Performed by: PHYSICIAN ASSISTANT

## 2025-04-16 PROCEDURE — G8417 CALC BMI ABV UP PARAM F/U: HCPCS | Performed by: PHYSICIAN ASSISTANT

## 2025-04-16 SDOH — ECONOMIC STABILITY: FOOD INSECURITY: WITHIN THE PAST 12 MONTHS, YOU WORRIED THAT YOUR FOOD WOULD RUN OUT BEFORE YOU GOT MONEY TO BUY MORE.: NEVER TRUE

## 2025-04-16 SDOH — ECONOMIC STABILITY: FOOD INSECURITY: WITHIN THE PAST 12 MONTHS, THE FOOD YOU BOUGHT JUST DIDN'T LAST AND YOU DIDN'T HAVE MONEY TO GET MORE.: NEVER TRUE

## 2025-04-16 ASSESSMENT — ENCOUNTER SYMPTOMS
BACK PAIN: 1
SINUS PAIN: 0
SINUS PRESSURE: 0
BLOOD IN STOOL: 0
COLOR CHANGE: 0
ABDOMINAL DISTENTION: 0
SHORTNESS OF BREATH: 0
COUGH: 0
CHEST TIGHTNESS: 0
ABDOMINAL PAIN: 0
RECTAL PAIN: 0
NAUSEA: 0
DIARRHEA: 0

## 2025-04-16 ASSESSMENT — PATIENT HEALTH QUESTIONNAIRE - PHQ9
SUM OF ALL RESPONSES TO PHQ QUESTIONS 1-9: 0
SUM OF ALL RESPONSES TO PHQ QUESTIONS 1-9: 0
1. LITTLE INTEREST OR PLEASURE IN DOING THINGS: NOT AT ALL
SUM OF ALL RESPONSES TO PHQ QUESTIONS 1-9: 0
2. FEELING DOWN, DEPRESSED OR HOPELESS: NOT AT ALL
SUM OF ALL RESPONSES TO PHQ QUESTIONS 1-9: 0

## 2025-04-16 NOTE — PROGRESS NOTES
Subjective  Estefani Jones, 69 y.o. female presents today with:  Chief Complaint   Patient presents with    Follow-up     4 month follow up no concerns        HPI  Last OV with me: 12/17/24.     Overall, feeling OK.  Stable on current medications.    She is due for fasting labs, she can go today.   Worried about her HgbA1c.      BP remains stable/controlled.  CKD-renal function is stable.      She is followed by pain management, she is taking tramadol for chronic back pain.  She has gone to PT, received injections, has been thinking about a pain stim.    Activity is affected by her back pain/discomfort.   No falls.     She is on 500 mg er metformin for prediabetes.   Trying to watching her CHO/sugar intake.  Admits to drinking milkshakes and soda daily.     Review of Systems   Constitutional:  Negative for activity change, appetite change, chills, diaphoresis, fatigue, fever and unexpected weight change.   HENT:  Negative for congestion, postnasal drip, sinus pressure, sinus pain and sneezing.    Respiratory:  Negative for cough, chest tightness and shortness of breath.    Cardiovascular:  Negative for chest pain, palpitations and leg swelling.   Gastrointestinal:  Negative for abdominal distention, abdominal pain, blood in stool, diarrhea, nausea and rectal pain.   Endocrine: Negative for polydipsia, polyphagia and polyuria.   Genitourinary:  Negative for decreased urine volume, enuresis, frequency, urgency, vaginal bleeding and vaginal discharge.   Musculoskeletal:  Positive for arthralgias, back pain and myalgias. Negative for gait problem and joint swelling.        Chronic   Skin:  Negative for color change and rash.   Allergic/Immunologic: Positive for environmental allergies.   Neurological:  Positive for weakness. Negative for dizziness, light-headedness, numbness and headaches (tension-type).   Psychiatric/Behavioral:  Negative for agitation, dysphoric mood, self-injury, sleep disturbance and suicidal

## 2025-04-18 ENCOUNTER — TELEPHONE (OUTPATIENT)
Age: 70
End: 2025-04-18

## 2025-04-18 NOTE — TELEPHONE ENCOUNTER
Patient states she was looking at her after visit summary from 4/16/2025 and it still says that she has stage 3a chronic kidney disease.    Patient states she has never had stage 3a chronic kidney disease and is asking for this to be removed from her chart.    Patient states that she does not want to take amlodipine 5 mg because it says it is for stage 3a chronic kidney disease.    Please Advise    Callback  [phone]

## 2025-04-21 NOTE — TELEPHONE ENCOUNTER
Comments:     Last Office Visit (last PCP visit):   4/16/2025    Next Visit Date:  Future Appointments   Date Time Provider Department Center   8/18/2025 10:15 AM Norma Espinosa PA-C [facility] DE[facility]         Rx requested:  Requested Prescriptions     Pending Prescriptions Disp Refills    allopurinol (ZYLOPRIM) 300 MG tablet [Pharmacy Med Name: ALLOPURINOL 300 MG TABS 300 Tablet] 90 tablet 2     Sig: Take 1 tablet by mouth daily

## 2025-04-21 NOTE — TELEPHONE ENCOUNTER
Pt aware,     Pt asking for CKD to be removed from chart, states this has been notated for 4 years [facility] has removed this from her chart would like [facility] taken [facility].     Please advise.

## 2025-04-23 RX ORDER — ALLOPURINOL 300 MG/1
300 TABLET ORAL DAILY
Qty: 90 TABLET | Refills: 2 | Status: SHIPPED | OUTPATIENT
Start: 2025-04-23

## 2025-05-27 DIAGNOSIS — N18.31 STAGE 3A CHRONIC KIDNEY DISEASE (HCC): ICD-10-CM

## 2025-05-27 DIAGNOSIS — E55.9 VITAMIN D DEFICIENCY: ICD-10-CM

## 2025-05-27 DIAGNOSIS — R73.03 PREDIABETES: ICD-10-CM

## 2025-05-27 LAB
ALBUMIN SERPL-MCNC: 4.4 G/DL (ref 3.5–4.6)
ALP SERPL-CCNC: 84 U/L (ref 40–130)
ALT SERPL-CCNC: 33 U/L (ref 0–33)
ANION GAP SERPL CALCULATED.3IONS-SCNC: 11 MEQ/L (ref 9–15)
AST SERPL-CCNC: 23 U/L (ref 0–35)
BILIRUB SERPL-MCNC: 0.8 MG/DL (ref 0.2–0.7)
BUN SERPL-MCNC: 11 MG/DL (ref 8–23)
CALCIUM SERPL-MCNC: 10 MG/DL (ref 8.5–9.9)
CHLORIDE SERPL-SCNC: 106 MEQ/L (ref 95–107)
CO2 SERPL-SCNC: 25 MEQ/L (ref 20–31)
CREAT SERPL-MCNC: 0.87 MG/DL (ref 0.5–0.9)
ERYTHROCYTE [DISTWIDTH] IN BLOOD BY AUTOMATED COUNT: 13.2 % (ref 11.5–14.5)
ESTIMATED AVERAGE GLUCOSE: 123 MG/DL
GLOBULIN SER CALC-MCNC: 3.8 G/DL (ref 2.3–3.5)
GLUCOSE SERPL-MCNC: 122 MG/DL (ref 70–99)
HBA1C MFR BLD: 5.9 % (ref 4–6)
HCT VFR BLD AUTO: 40.6 % (ref 37–47)
HGB BLD-MCNC: 13.6 G/DL (ref 12–16)
MCH RBC QN AUTO: 28.5 PG (ref 27–31.3)
MCHC RBC AUTO-ENTMCNC: 33.5 % (ref 33–37)
MCV RBC AUTO: 84.9 FL (ref 79.4–94.8)
PLATELET # BLD AUTO: 289 K/UL (ref 130–400)
POTASSIUM SERPL-SCNC: 4.5 MEQ/L (ref 3.4–4.9)
PROT SERPL-MCNC: 8.2 G/DL (ref 6.3–8)
RBC # BLD AUTO: 4.78 M/UL (ref 4.2–5.4)
SODIUM SERPL-SCNC: 142 MEQ/L (ref 135–144)
URATE SERPL-MCNC: 6.3 MG/DL (ref 2.4–5.7)
VITAMIN D 25-HYDROXY: 41.8 NG/ML (ref 30–100)
WBC # BLD AUTO: 6 K/UL (ref 4.8–10.8)

## 2025-05-30 ENCOUNTER — RESULTS FOLLOW-UP (OUTPATIENT)
Age: 70
End: 2025-05-30

## 2025-06-21 DIAGNOSIS — N18.31 STAGE 3A CHRONIC KIDNEY DISEASE (HCC): ICD-10-CM

## 2025-06-21 DIAGNOSIS — I10 BENIGN ESSENTIAL HTN: ICD-10-CM

## 2025-06-22 NOTE — TELEPHONE ENCOUNTER
Comments:     Last Office Visit (last PCP visit):   4/16/2025    Next Visit Date:  Future Appointments   Date Time Provider Department Center   8/18/2025 10:15 AM Norma Espinosa PA-C [facility] DE[facility]         Rx requested:  Requested Prescriptions     Pending Prescriptions Disp Refills    lisinopril (PRINIVIL;ZESTRIL) 20 MG tablet [Pharmacy Med Name: LISINOPRIL 20 MG TABLET 20 Tablet] 90 tablet 1     Sig: Take 1 tablet by mouth daily

## 2025-06-23 RX ORDER — LISINOPRIL 20 MG/1
20 TABLET ORAL DAILY
Qty: 90 TABLET | Refills: 1 | Status: SHIPPED | OUTPATIENT
Start: 2025-06-23

## 2025-06-24 DIAGNOSIS — R73.03 PREDIABETES: ICD-10-CM

## 2025-06-24 NOTE — TELEPHONE ENCOUNTER
Comments:     Last Office Visit (last PCP visit):   2025    Next Visit Date:  Future Appointments   Date Time Provider Department Center   2025 10:15 AM [facility]         Rx requested:  Requested Prescriptions     Pending Prescriptions Disp Refills    Lancets (ONETOUCH DELICA PLUS [id number]) MISC [Pharmacy Med Name: ONETOUCH DELICA PLUS LANCET Miscellaneous] 300 each 1     Si EACH BY DOES NOT APPLY ROUTE 2 TIMES DAILY

## 2025-06-25 RX ORDER — LANCETS 33 GAUGE
1 EACH MISCELLANEOUS 2 TIMES DAILY
Qty: 300 EACH | Refills: 1 | Status: SHIPPED | OUTPATIENT
Start: 2025-06-25

## 2025-06-28 DIAGNOSIS — J30.2 SEASONAL ALLERGIES: ICD-10-CM

## 2025-06-30 RX ORDER — FLUTICASONE PROPIONATE 50 MCG
SPRAY, SUSPENSION (ML) NASAL
Qty: 16 G | Refills: 3 | Status: SHIPPED | OUTPATIENT
Start: 2025-06-30

## 2025-06-30 NOTE — TELEPHONE ENCOUNTER
Comments:     Last Office Visit (last PCP visit):   4/16/2025    Next Visit Date:  Future Appointments   Date Time Provider Department Center   8/18/2025 10:15 AM Norma Espinosa PA-C [facility] DEP         Rx requested:  Requested Prescriptions     Pending Prescriptions Disp Refills    fluticasone (FLONASE) 50 MCG/ACT nasal spray [Pharmacy Med Name: FLUTICASONE PROP 50 MCG SPR 50 ROGER] 16 g 3     Sig: SHAKE LIQUID AND USE 1 SPRAY IN EACH NOSTRIL DAILY

## 2025-07-22 RX ORDER — METFORMIN HYDROCHLORIDE 500 MG/1
500 TABLET, EXTENDED RELEASE ORAL
Qty: 90 TABLET | Refills: 5 | Status: SHIPPED | OUTPATIENT
Start: 2025-07-22

## 2025-07-22 NOTE — TELEPHONE ENCOUNTER
Comments:     Last Office Visit (last PCP visit):   4/16/2025    Next Visit Date:  Future Appointments   Date Time Provider Department Center   8/18/2025 10:15 AM Norma Clark PA-C [facility] DEP         Rx requested:  Requested Prescriptions     Pending Prescriptions Disp Refills    metFORMIN (GLUCOPHAGE-XR) 500 MG extended release tablet [Pharmacy Med Name: METFORMIN HCL  MG TB2 500 Tablet] 90 tablet 5     Sig: Take 1 tablet by mouth daily (with breakfast)

## 2025-08-18 ENCOUNTER — OFFICE VISIT (OUTPATIENT)
Age: 70
End: 2025-08-18
Payer: MEDICARE

## 2025-08-18 VITALS
SYSTOLIC BLOOD PRESSURE: 122 MMHG | RESPIRATION RATE: 16 BRPM | BODY MASS INDEX: 34.4 KG/M2 | WEIGHT: 227 LBS | HEART RATE: 62 BPM | HEIGHT: 68 IN | DIASTOLIC BLOOD PRESSURE: 80 MMHG | OXYGEN SATURATION: 95 %

## 2025-08-18 DIAGNOSIS — I10 BENIGN ESSENTIAL HTN: ICD-10-CM

## 2025-08-18 DIAGNOSIS — E79.0 HYPERURICEMIA: ICD-10-CM

## 2025-08-18 DIAGNOSIS — N18.31 STAGE 3A CHRONIC KIDNEY DISEASE (HCC): ICD-10-CM

## 2025-08-18 DIAGNOSIS — R73.03 PREDIABETES: ICD-10-CM

## 2025-08-18 DIAGNOSIS — Z13.6 ENCOUNTER FOR LIPID SCREENING FOR CARDIOVASCULAR DISEASE: ICD-10-CM

## 2025-08-18 DIAGNOSIS — Z00.00 MEDICARE ANNUAL WELLNESS VISIT, SUBSEQUENT: Primary | ICD-10-CM

## 2025-08-18 DIAGNOSIS — Z78.0 POST-MENOPAUSAL: ICD-10-CM

## 2025-08-18 DIAGNOSIS — M48.062 SPINAL STENOSIS OF LUMBAR REGION WITH NEUROGENIC CLAUDICATION: ICD-10-CM

## 2025-08-18 DIAGNOSIS — Z13.220 ENCOUNTER FOR LIPID SCREENING FOR CARDIOVASCULAR DISEASE: ICD-10-CM

## 2025-08-18 DIAGNOSIS — F41.9 ANXIETY: ICD-10-CM

## 2025-08-18 PROCEDURE — G8400 PT W/DXA NO RESULTS DOC: HCPCS | Performed by: PHYSICIAN ASSISTANT

## 2025-08-18 PROCEDURE — 1159F MED LIST DOCD IN RCRD: CPT | Performed by: PHYSICIAN ASSISTANT

## 2025-08-18 PROCEDURE — 99214 OFFICE O/P EST MOD 30 MIN: CPT | Performed by: PHYSICIAN ASSISTANT

## 2025-08-18 PROCEDURE — 1036F TOBACCO NON-USER: CPT | Performed by: PHYSICIAN ASSISTANT

## 2025-08-18 PROCEDURE — G8417 CALC BMI ABV UP PARAM F/U: HCPCS | Performed by: PHYSICIAN ASSISTANT

## 2025-08-18 PROCEDURE — G0439 PPPS, SUBSEQ VISIT: HCPCS | Performed by: PHYSICIAN ASSISTANT

## 2025-08-18 PROCEDURE — 3017F COLORECTAL CA SCREEN DOC REV: CPT | Performed by: PHYSICIAN ASSISTANT

## 2025-08-18 PROCEDURE — 1160F RVW MEDS BY RX/DR IN RCRD: CPT | Performed by: PHYSICIAN ASSISTANT

## 2025-08-18 PROCEDURE — G8427 DOCREV CUR MEDS BY ELIG CLIN: HCPCS | Performed by: PHYSICIAN ASSISTANT

## 2025-08-18 PROCEDURE — 1090F PRES/ABSN URINE INCON ASSESS: CPT | Performed by: PHYSICIAN ASSISTANT

## 2025-08-18 PROCEDURE — 1123F ACP DISCUSS/DSCN MKR DOCD: CPT | Performed by: PHYSICIAN ASSISTANT

## 2025-08-18 PROCEDURE — 3074F SYST BP LT 130 MM HG: CPT | Performed by: PHYSICIAN ASSISTANT

## 2025-08-18 PROCEDURE — 3079F DIAST BP 80-89 MM HG: CPT | Performed by: PHYSICIAN ASSISTANT

## 2025-08-18 PROCEDURE — 1125F AMNT PAIN NOTED PAIN PRSNT: CPT | Performed by: PHYSICIAN ASSISTANT

## 2025-08-18 RX ORDER — ISOPROPYL ALCOHOL 70 ML/100ML
SWAB TOPICAL
Qty: 100 EACH | Refills: 3 | Status: SHIPPED | OUTPATIENT
Start: 2025-08-18

## 2025-08-18 RX ORDER — HYDROCHLOROTHIAZIDE 12.5 MG/1
CAPSULE ORAL
Qty: 3 EACH | Refills: 5 | Status: SHIPPED | OUTPATIENT
Start: 2025-08-18

## 2025-08-18 ASSESSMENT — PATIENT HEALTH QUESTIONNAIRE - PHQ9
SUM OF ALL RESPONSES TO PHQ QUESTIONS 1-9: 1
SUM OF ALL RESPONSES TO PHQ QUESTIONS 1-9: 1
2. FEELING DOWN, DEPRESSED OR HOPELESS: SEVERAL DAYS
1. LITTLE INTEREST OR PLEASURE IN DOING THINGS: NOT AT ALL
SUM OF ALL RESPONSES TO PHQ QUESTIONS 1-9: 1
SUM OF ALL RESPONSES TO PHQ QUESTIONS 1-9: 1

## 2025-08-18 ASSESSMENT — LIFESTYLE VARIABLES
HOW MANY STANDARD DRINKS CONTAINING ALCOHOL DO YOU HAVE ON A TYPICAL DAY: PATIENT DOES NOT DRINK
HOW OFTEN DO YOU HAVE A DRINK CONTAINING ALCOHOL: NEVER